# Patient Record
Sex: FEMALE | Race: WHITE | Employment: OTHER
[De-identification: names, ages, dates, MRNs, and addresses within clinical notes are randomized per-mention and may not be internally consistent; named-entity substitution may affect disease eponyms.]

---

## 2017-01-03 ENCOUNTER — OFFICE VISIT (OUTPATIENT)
Dept: FAMILY MEDICINE CLINIC | Facility: CLINIC | Age: 82
End: 2017-01-03

## 2017-01-03 VITALS
HEART RATE: 68 BPM | DIASTOLIC BLOOD PRESSURE: 70 MMHG | SYSTOLIC BLOOD PRESSURE: 132 MMHG | BODY MASS INDEX: 39.96 KG/M2 | WEIGHT: 204.6 LBS

## 2017-01-03 DIAGNOSIS — H10.33 ACUTE BACTERIAL CONJUNCTIVITIS OF BOTH EYES: Primary | ICD-10-CM

## 2017-01-03 DIAGNOSIS — B37.31 YEAST VAGINITIS: ICD-10-CM

## 2017-01-03 DIAGNOSIS — B37.0 ORAL THRUSH: ICD-10-CM

## 2017-01-03 DIAGNOSIS — H04.129 DRY EYES DUE TO DECREASED TEAR PRODUCTION: ICD-10-CM

## 2017-01-03 DIAGNOSIS — M19.91 PRIMARY OSTEOARTHRITIS, UNSPECIFIED SITE: ICD-10-CM

## 2017-01-03 PROCEDURE — 99214 OFFICE O/P EST MOD 30 MIN: CPT | Performed by: FAMILY MEDICINE

## 2017-01-03 PROCEDURE — 96372 THER/PROPH/DIAG INJ SC/IM: CPT | Performed by: FAMILY MEDICINE

## 2017-01-03 RX ORDER — METHYLPREDNISOLONE ACETATE 80 MG/ML
120 INJECTION, SUSPENSION INTRA-ARTICULAR; INTRALESIONAL; INTRAMUSCULAR; SOFT TISSUE ONCE
Status: COMPLETED | OUTPATIENT
Start: 2017-01-03 | End: 2017-01-03

## 2017-01-03 RX ORDER — CLOTRIMAZOLE 10 MG/1
10 LOZENGE ORAL; TOPICAL 3 TIMES DAILY
Qty: 30 TABLET | Refills: 0 | Status: SHIPPED | OUTPATIENT
Start: 2017-01-03 | End: 2017-01-13

## 2017-01-03 RX ORDER — FLUCONAZOLE 150 MG/1
150 TABLET ORAL ONCE
Qty: 2 TABLET | Refills: 0 | Status: SHIPPED | OUTPATIENT
Start: 2017-01-03 | End: 2017-01-03

## 2017-01-03 RX ADMIN — METHYLPREDNISOLONE ACETATE 120 MG: 80 INJECTION, SUSPENSION INTRA-ARTICULAR; INTRALESIONAL; INTRAMUSCULAR; SOFT TISSUE at 16:20

## 2017-02-13 ENCOUNTER — TELEPHONE (OUTPATIENT)
Dept: FAMILY MEDICINE CLINIC | Facility: CLINIC | Age: 82
End: 2017-02-13

## 2017-02-14 ENCOUNTER — OFFICE VISIT (OUTPATIENT)
Dept: FAMILY MEDICINE CLINIC | Facility: CLINIC | Age: 82
End: 2017-02-14

## 2017-02-14 VITALS
SYSTOLIC BLOOD PRESSURE: 120 MMHG | HEART RATE: 67 BPM | DIASTOLIC BLOOD PRESSURE: 70 MMHG | RESPIRATION RATE: 16 BRPM | OXYGEN SATURATION: 98 %

## 2017-02-14 DIAGNOSIS — S01.81XA LACERATION OF FACE, INITIAL ENCOUNTER: Primary | ICD-10-CM

## 2017-02-14 DIAGNOSIS — I89.0 LYMPHEDEMA OF BOTH LOWER EXTREMITIES: ICD-10-CM

## 2017-02-14 DIAGNOSIS — Z48.02 VISIT FOR SUTURE REMOVAL: ICD-10-CM

## 2017-02-14 DIAGNOSIS — R60.0 BILATERAL EDEMA OF LOWER EXTREMITY: Primary | ICD-10-CM

## 2017-02-14 PROBLEM — C50.919 BREAST CANCER IN FEMALE (HCC): Status: ACTIVE | Noted: 2017-02-10

## 2017-02-14 PROCEDURE — G8400 PT W/DXA NO RESULTS DOC: HCPCS | Performed by: FAMILY MEDICINE

## 2017-02-14 PROCEDURE — 4040F PNEUMOC VAC/ADMIN/RCVD: CPT | Performed by: FAMILY MEDICINE

## 2017-02-14 PROCEDURE — 1090F PRES/ABSN URINE INCON ASSESS: CPT | Performed by: FAMILY MEDICINE

## 2017-02-14 PROCEDURE — 1036F TOBACCO NON-USER: CPT | Performed by: FAMILY MEDICINE

## 2017-02-14 PROCEDURE — G8417 CALC BMI ABV UP PARAM F/U: HCPCS | Performed by: FAMILY MEDICINE

## 2017-02-14 PROCEDURE — G8427 DOCREV CUR MEDS BY ELIG CLIN: HCPCS | Performed by: FAMILY MEDICINE

## 2017-02-14 PROCEDURE — G8484 FLU IMMUNIZE NO ADMIN: HCPCS | Performed by: FAMILY MEDICINE

## 2017-02-14 PROCEDURE — 99214 OFFICE O/P EST MOD 30 MIN: CPT | Performed by: FAMILY MEDICINE

## 2017-02-14 PROCEDURE — 1123F ACP DISCUSS/DSCN MKR DOCD: CPT | Performed by: FAMILY MEDICINE

## 2017-02-14 RX ORDER — HYDROCODONE BITARTRATE AND ACETAMINOPHEN 5; 325 MG/1; MG/1
2 TABLET ORAL
COMMUNITY
Start: 2017-02-10 | End: 2017-02-14 | Stop reason: ALTCHOICE

## 2017-02-14 RX ORDER — MECLIZINE HYDROCHLORIDE 25 MG/1
TABLET ORAL
COMMUNITY
End: 2019-10-15 | Stop reason: SDUPTHER

## 2017-02-14 ASSESSMENT — PATIENT HEALTH QUESTIONNAIRE - PHQ9
SUM OF ALL RESPONSES TO PHQ QUESTIONS 1-9: 0
SUM OF ALL RESPONSES TO PHQ9 QUESTIONS 1 & 2: 0
1. LITTLE INTEREST OR PLEASURE IN DOING THINGS: 0
2. FEELING DOWN, DEPRESSED OR HOPELESS: 0

## 2017-02-15 ENCOUNTER — TELEPHONE (OUTPATIENT)
Dept: FAMILY MEDICINE CLINIC | Facility: CLINIC | Age: 82
End: 2017-02-15

## 2017-02-15 RX ORDER — PREDNISONE 10 MG/1
TABLET ORAL
Qty: 30 TABLET | Refills: 0 | Status: SHIPPED | OUTPATIENT
Start: 2017-02-15 | End: 2017-02-25

## 2017-04-13 ENCOUNTER — OFFICE VISIT (OUTPATIENT)
Dept: FAMILY MEDICINE CLINIC | Age: 82
End: 2017-04-13
Payer: MEDICARE

## 2017-04-13 VITALS
SYSTOLIC BLOOD PRESSURE: 140 MMHG | BODY MASS INDEX: 40.43 KG/M2 | HEART RATE: 60 BPM | DIASTOLIC BLOOD PRESSURE: 74 MMHG | WEIGHT: 207 LBS

## 2017-04-13 DIAGNOSIS — T23.102A BURN, HAND, FIRST DEGREE, LEFT, INITIAL ENCOUNTER: Primary | ICD-10-CM

## 2017-04-13 DIAGNOSIS — B36.9 FUNGAL DERMATITIS: ICD-10-CM

## 2017-04-13 DIAGNOSIS — L23.3 ALLERGIC CONTACT DERMATITIS DUE TO DRUGS IN CONTACT WITH SKIN: ICD-10-CM

## 2017-04-13 PROCEDURE — 99214 OFFICE O/P EST MOD 30 MIN: CPT | Performed by: FAMILY MEDICINE

## 2017-04-13 RX ORDER — NYSTATIN 100000 U/G
CREAM TOPICAL
Qty: 60 G | Refills: 3 | Status: SHIPPED | OUTPATIENT
Start: 2017-04-13 | End: 2018-07-02 | Stop reason: ALTCHOICE

## 2017-04-27 ENCOUNTER — OFFICE VISIT (OUTPATIENT)
Dept: FAMILY MEDICINE CLINIC | Age: 82
End: 2017-04-27
Payer: MEDICARE

## 2017-04-27 VITALS — SYSTOLIC BLOOD PRESSURE: 130 MMHG | HEART RATE: 74 BPM | DIASTOLIC BLOOD PRESSURE: 70 MMHG | OXYGEN SATURATION: 97 %

## 2017-04-27 DIAGNOSIS — T23.201D: Primary | ICD-10-CM

## 2017-04-27 DIAGNOSIS — L30.4 INTERTRIGO: ICD-10-CM

## 2017-04-27 PROCEDURE — 1036F TOBACCO NON-USER: CPT | Performed by: FAMILY MEDICINE

## 2017-04-27 PROCEDURE — G8428 CUR MEDS NOT DOCUMENT: HCPCS | Performed by: FAMILY MEDICINE

## 2017-04-27 PROCEDURE — 1123F ACP DISCUSS/DSCN MKR DOCD: CPT | Performed by: FAMILY MEDICINE

## 2017-04-27 PROCEDURE — G8417 CALC BMI ABV UP PARAM F/U: HCPCS | Performed by: FAMILY MEDICINE

## 2017-04-27 PROCEDURE — 1090F PRES/ABSN URINE INCON ASSESS: CPT | Performed by: FAMILY MEDICINE

## 2017-04-27 PROCEDURE — 99213 OFFICE O/P EST LOW 20 MIN: CPT | Performed by: FAMILY MEDICINE

## 2017-04-27 PROCEDURE — G8400 PT W/DXA NO RESULTS DOC: HCPCS | Performed by: FAMILY MEDICINE

## 2017-04-27 PROCEDURE — 4040F PNEUMOC VAC/ADMIN/RCVD: CPT | Performed by: FAMILY MEDICINE

## 2017-04-27 ASSESSMENT — ENCOUNTER SYMPTOMS
WHEEZING: 0
ABDOMINAL DISTENTION: 0
ABDOMINAL PAIN: 0
NAUSEA: 0
EYE PAIN: 0
SINUS PRESSURE: 0
FACIAL SWELLING: 0
VOICE CHANGE: 0
CONSTIPATION: 1
SORE THROAT: 0
EYE ITCHING: 0
PHOTOPHOBIA: 0
DIARRHEA: 0
COLOR CHANGE: 0
EYE REDNESS: 0
VOMITING: 0
COUGH: 0
CHEST TIGHTNESS: 0
SHORTNESS OF BREATH: 0
BACK PAIN: 0
RHINORRHEA: 0
BLOOD IN STOOL: 0
EYE DISCHARGE: 0

## 2017-06-08 ENCOUNTER — TELEPHONE (OUTPATIENT)
Dept: FAMILY MEDICINE CLINIC | Age: 82
End: 2017-06-08

## 2017-06-08 RX ORDER — CEPHALEXIN 500 MG/1
500 CAPSULE ORAL 2 TIMES DAILY
Qty: 20 CAPSULE | Refills: 0 | Status: SHIPPED | OUTPATIENT
Start: 2017-06-08 | End: 2017-06-18

## 2017-06-09 ENCOUNTER — OFFICE VISIT (OUTPATIENT)
Dept: FAMILY MEDICINE CLINIC | Age: 82
End: 2017-06-09
Payer: MEDICARE

## 2017-06-09 VITALS
OXYGEN SATURATION: 93 % | WEIGHT: 204.2 LBS | TEMPERATURE: 98.8 F | DIASTOLIC BLOOD PRESSURE: 62 MMHG | HEART RATE: 64 BPM | BODY MASS INDEX: 38.55 KG/M2 | SYSTOLIC BLOOD PRESSURE: 122 MMHG | HEIGHT: 61 IN | RESPIRATION RATE: 16 BRPM

## 2017-06-09 DIAGNOSIS — I48.21 PERMANENT ATRIAL FIBRILLATION (HCC): ICD-10-CM

## 2017-06-09 DIAGNOSIS — T14.8XXA HEMATOMA: Primary | ICD-10-CM

## 2017-06-09 DIAGNOSIS — J30.89 PERENNIAL ALLERGIC RHINITIS, UNSPECIFIED ALLERGIC RHINITIS TRIGGER: ICD-10-CM

## 2017-06-09 DIAGNOSIS — I47.1 PAT (PAROXYSMAL ATRIAL TACHYCARDIA) (HCC): ICD-10-CM

## 2017-06-09 PROCEDURE — 4040F PNEUMOC VAC/ADMIN/RCVD: CPT | Performed by: FAMILY MEDICINE

## 2017-06-09 PROCEDURE — 1090F PRES/ABSN URINE INCON ASSESS: CPT | Performed by: FAMILY MEDICINE

## 2017-06-09 PROCEDURE — G8400 PT W/DXA NO RESULTS DOC: HCPCS | Performed by: FAMILY MEDICINE

## 2017-06-09 PROCEDURE — 1036F TOBACCO NON-USER: CPT | Performed by: FAMILY MEDICINE

## 2017-06-09 PROCEDURE — G8417 CALC BMI ABV UP PARAM F/U: HCPCS | Performed by: FAMILY MEDICINE

## 2017-06-09 PROCEDURE — G8427 DOCREV CUR MEDS BY ELIG CLIN: HCPCS | Performed by: FAMILY MEDICINE

## 2017-06-09 PROCEDURE — 99213 OFFICE O/P EST LOW 20 MIN: CPT | Performed by: FAMILY MEDICINE

## 2017-06-09 PROCEDURE — 1123F ACP DISCUSS/DSCN MKR DOCD: CPT | Performed by: FAMILY MEDICINE

## 2017-06-09 RX ORDER — MONTELUKAST SODIUM 10 MG/1
10 TABLET ORAL DAILY
Qty: 30 TABLET | Refills: 3 | Status: SHIPPED | OUTPATIENT
Start: 2017-06-09 | End: 2018-07-02 | Stop reason: ALTCHOICE

## 2017-06-09 ASSESSMENT — ENCOUNTER SYMPTOMS
BACK PAIN: 0
COUGH: 0
BLOOD IN STOOL: 0
EYE DISCHARGE: 0
SHORTNESS OF BREATH: 0
PHOTOPHOBIA: 0
WHEEZING: 0
EYE ITCHING: 0
DIARRHEA: 0
EYE PAIN: 0
COLOR CHANGE: 0
SORE THROAT: 0
VOICE CHANGE: 0
ABDOMINAL DISTENTION: 0
FACIAL SWELLING: 0
VOMITING: 0
CONSTIPATION: 0
EYE REDNESS: 0
ABDOMINAL PAIN: 0
SINUS PRESSURE: 0
RHINORRHEA: 0
NAUSEA: 0
CHEST TIGHTNESS: 0

## 2017-09-08 DIAGNOSIS — G45.8 ANTERIOR CIRCULATION TRANSIENT ISCHEMIC ATTACK: ICD-10-CM

## 2017-09-08 DIAGNOSIS — I05.9 MITRAL VALVE DISEASE: ICD-10-CM

## 2017-09-08 DIAGNOSIS — I89.0 LYMPHEDEMA OF BOTH LOWER EXTREMITIES: ICD-10-CM

## 2017-09-08 DIAGNOSIS — I47.1 PAROXYSMAL SUPRAVENTRICULAR TACHYCARDIA (HCC): ICD-10-CM

## 2017-09-08 DIAGNOSIS — I89.0 ACQUIRED LYMPHEDEMA: ICD-10-CM

## 2017-09-08 DIAGNOSIS — Z95.0 PRESENCE OF CARDIAC PACEMAKER: ICD-10-CM

## 2018-06-03 ENCOUNTER — TELEPHONE (OUTPATIENT)
Dept: FAMILY MEDICINE CLINIC | Age: 83
End: 2018-06-03

## 2018-06-03 RX ORDER — PHENAZOPYRIDINE HYDROCHLORIDE 100 MG/1
100 TABLET, FILM COATED ORAL 3 TIMES DAILY PRN
Qty: 9 TABLET | Refills: 0 | Status: SHIPPED | OUTPATIENT
Start: 2018-06-03 | End: 2018-06-06

## 2018-06-03 RX ORDER — CEPHALEXIN 500 MG/1
500 CAPSULE ORAL 2 TIMES DAILY
Qty: 20 CAPSULE | Refills: 0 | Status: SHIPPED | OUTPATIENT
Start: 2018-06-03 | End: 2018-06-20

## 2018-06-16 ENCOUNTER — NURSE TRIAGE (OUTPATIENT)
Dept: OTHER | Age: 83
End: 2018-06-16

## 2018-06-20 ENCOUNTER — TELEPHONE (OUTPATIENT)
Dept: FAMILY MEDICINE CLINIC | Age: 83
End: 2018-06-20

## 2018-06-21 RX ORDER — LEVOFLOXACIN 500 MG/1
500 TABLET, FILM COATED ORAL DAILY
Qty: 7 TABLET | Refills: 0 | Status: SHIPPED | OUTPATIENT
Start: 2018-06-21 | End: 2018-06-21 | Stop reason: SDUPTHER

## 2018-06-21 RX ORDER — LEVOFLOXACIN 500 MG/1
500 TABLET, FILM COATED ORAL DAILY
Qty: 7 TABLET | Refills: 0 | Status: SHIPPED | OUTPATIENT
Start: 2018-06-21 | End: 2018-06-28

## 2018-07-02 ENCOUNTER — OFFICE VISIT (OUTPATIENT)
Dept: FAMILY MEDICINE CLINIC | Age: 83
End: 2018-07-02
Payer: MEDICARE

## 2018-07-02 VITALS
HEART RATE: 67 BPM | DIASTOLIC BLOOD PRESSURE: 58 MMHG | WEIGHT: 198.2 LBS | BODY MASS INDEX: 38.91 KG/M2 | HEIGHT: 60 IN | OXYGEN SATURATION: 98 % | SYSTOLIC BLOOD PRESSURE: 118 MMHG

## 2018-07-02 DIAGNOSIS — J31.0 CHRONIC RHINITIS, UNSPECIFIED TYPE: Primary | ICD-10-CM

## 2018-07-02 PROCEDURE — G8427 DOCREV CUR MEDS BY ELIG CLIN: HCPCS | Performed by: FAMILY MEDICINE

## 2018-07-02 PROCEDURE — 1123F ACP DISCUSS/DSCN MKR DOCD: CPT | Performed by: FAMILY MEDICINE

## 2018-07-02 PROCEDURE — 3288F FALL RISK ASSESSMENT DOCD: CPT | Performed by: FAMILY MEDICINE

## 2018-07-02 PROCEDURE — G8510 SCR DEP NEG, NO PLAN REQD: HCPCS | Performed by: FAMILY MEDICINE

## 2018-07-02 PROCEDURE — G8417 CALC BMI ABV UP PARAM F/U: HCPCS | Performed by: FAMILY MEDICINE

## 2018-07-02 PROCEDURE — 4040F PNEUMOC VAC/ADMIN/RCVD: CPT | Performed by: FAMILY MEDICINE

## 2018-07-02 PROCEDURE — 1090F PRES/ABSN URINE INCON ASSESS: CPT | Performed by: FAMILY MEDICINE

## 2018-07-02 PROCEDURE — 99213 OFFICE O/P EST LOW 20 MIN: CPT | Performed by: FAMILY MEDICINE

## 2018-07-02 PROCEDURE — 1036F TOBACCO NON-USER: CPT | Performed by: FAMILY MEDICINE

## 2018-07-02 RX ORDER — MONTELUKAST SODIUM 10 MG/1
10 TABLET ORAL DAILY
Qty: 30 TABLET | Refills: 5 | Status: SHIPPED | OUTPATIENT
Start: 2018-07-02 | End: 2018-10-18 | Stop reason: SINTOL

## 2018-07-02 ASSESSMENT — ENCOUNTER SYMPTOMS
SHORTNESS OF BREATH: 1
COUGH: 1
PHOTOPHOBIA: 0
EYE DISCHARGE: 0
VOMITING: 0
EYE PAIN: 0
NAUSEA: 0
CHEST TIGHTNESS: 0
SINUS PRESSURE: 0
CONSTIPATION: 0
SORE THROAT: 0
FACIAL SWELLING: 0
BACK PAIN: 0
VOICE CHANGE: 0
EYE ITCHING: 0
BLOOD IN STOOL: 0
WHEEZING: 0
COLOR CHANGE: 0
RHINORRHEA: 1
EYE REDNESS: 0
ABDOMINAL DISTENTION: 0
DIARRHEA: 0
ABDOMINAL PAIN: 0

## 2018-07-02 ASSESSMENT — PATIENT HEALTH QUESTIONNAIRE - PHQ9
SUM OF ALL RESPONSES TO PHQ QUESTIONS 1-9: 0
2. FEELING DOWN, DEPRESSED OR HOPELESS: 0
SUM OF ALL RESPONSES TO PHQ9 QUESTIONS 1 & 2: 0
1. LITTLE INTEREST OR PLEASURE IN DOING THINGS: 0

## 2018-07-02 NOTE — PROGRESS NOTES
side.  Skin: Skin is warm and dry. No lesion and no rash noted. She is not diaphoretic. No cyanosis. Nails show no clubbing. Psychiatric: She has a normal mood and affect. Her speech is normal and behavior is normal. Judgment and thought content normal. Cognition and memory are normal.     Vitals:    07/02/18 1045   BP: (!) 118/58   Pulse: 67   SpO2: 98%   Weight: 198 lb 3.2 oz (89.9 kg)   Height: 5' (1.524 m)         Assessment:          Plan:      1. Chronic rhinitis, unspecified type  Will have her start on singulair and have her follow up as needed based on the results. - montelukast (SINGULAIR) 10 MG tablet; Take 1 tablet by mouth daily  Dispense: 30 tablet;  Refill: 5

## 2018-07-06 DIAGNOSIS — N39.0 URINARY TRACT INFECTION WITHOUT HEMATURIA, SITE UNSPECIFIED: Primary | ICD-10-CM

## 2018-07-06 LAB
BILIRUBIN, POC: NEGATIVE
BLOOD URINE, POC: NORMAL
CLARITY, POC: NORMAL
COLOR, POC: YELLOW
GLUCOSE URINE, POC: NEGATIVE
KETONES, POC: NEGATIVE
LEUKOCYTE EST, POC: NEGATIVE
NITRITE, POC: NEGATIVE
PH, POC: 6
PROTEIN, POC: NEGATIVE
SPECIFIC GRAVITY, POC: 1
UROBILINOGEN, POC: 0.2

## 2018-07-06 PROCEDURE — 81003 URINALYSIS AUTO W/O SCOPE: CPT | Performed by: FAMILY MEDICINE

## 2018-10-18 ENCOUNTER — OFFICE VISIT (OUTPATIENT)
Dept: FAMILY MEDICINE CLINIC | Age: 83
End: 2018-10-18
Payer: MEDICARE

## 2018-10-18 VITALS
HEART RATE: 61 BPM | OXYGEN SATURATION: 96 % | BODY MASS INDEX: 38.83 KG/M2 | DIASTOLIC BLOOD PRESSURE: 62 MMHG | WEIGHT: 198.8 LBS | SYSTOLIC BLOOD PRESSURE: 136 MMHG

## 2018-10-18 DIAGNOSIS — Z00.00 MEDICARE ANNUAL WELLNESS VISIT, SUBSEQUENT: Primary | ICD-10-CM

## 2018-10-18 DIAGNOSIS — I48.20 CHRONIC ATRIAL FIBRILLATION (HCC): ICD-10-CM

## 2018-10-18 DIAGNOSIS — I49.5 SICK SINUS SYNDROME (HCC): ICD-10-CM

## 2018-10-18 DIAGNOSIS — Z23 IMMUNIZATION DUE: ICD-10-CM

## 2018-10-18 DIAGNOSIS — I47.1 PAROXYSMAL SUPRAVENTRICULAR TACHYCARDIA (HCC): ICD-10-CM

## 2018-10-18 DIAGNOSIS — Z00.00 ROUTINE GENERAL MEDICAL EXAMINATION AT A HEALTH CARE FACILITY: ICD-10-CM

## 2018-10-18 PROCEDURE — G8482 FLU IMMUNIZE ORDER/ADMIN: HCPCS | Performed by: FAMILY MEDICINE

## 2018-10-18 PROCEDURE — 4040F PNEUMOC VAC/ADMIN/RCVD: CPT | Performed by: FAMILY MEDICINE

## 2018-10-18 PROCEDURE — G0438 PPPS, INITIAL VISIT: HCPCS | Performed by: FAMILY MEDICINE

## 2018-10-18 PROCEDURE — 90662 IIV NO PRSV INCREASED AG IM: CPT | Performed by: FAMILY MEDICINE

## 2018-10-18 PROCEDURE — G0008 ADMIN INFLUENZA VIRUS VAC: HCPCS | Performed by: FAMILY MEDICINE

## 2018-10-18 ASSESSMENT — ENCOUNTER SYMPTOMS
COUGH: 0
EYE ITCHING: 0
EYE REDNESS: 0
CHEST TIGHTNESS: 0
SHORTNESS OF BREATH: 0
ABDOMINAL PAIN: 0
EYE DISCHARGE: 0
SORE THROAT: 0
ABDOMINAL DISTENTION: 0
DIARRHEA: 0
BLOOD IN STOOL: 0
NAUSEA: 0
CONSTIPATION: 1
COLOR CHANGE: 0
WHEEZING: 0
EYE PAIN: 0
VOMITING: 0
PHOTOPHOBIA: 0
BACK PAIN: 0
FACIAL SWELLING: 0
VOICE CHANGE: 0
SINUS PRESSURE: 0
RHINORRHEA: 0

## 2018-10-18 ASSESSMENT — ANXIETY QUESTIONNAIRES: GAD7 TOTAL SCORE: 1

## 2018-10-18 ASSESSMENT — PATIENT HEALTH QUESTIONNAIRE - PHQ9
SUM OF ALL RESPONSES TO PHQ QUESTIONS 1-9: 1
SUM OF ALL RESPONSES TO PHQ QUESTIONS 1-9: 1

## 2018-10-18 ASSESSMENT — LIFESTYLE VARIABLES: HOW OFTEN DO YOU HAVE A DRINK CONTAINING ALCOHOL: 0

## 2018-10-18 NOTE — PROGRESS NOTES
Subjective:      Patient ID: Chino Espinal is a 80 y.o. female. HPI  Here for annual well check and has been well generally aside from aches and pains. She has been feeling increasingly fatigued and has to put in more effort to attend to ADLs than she used to and has been using her walker all the time to ensure stability. She has been continuing to live alone but has a lot of help at home. She also has someone coming in to help her with the lymphedema pumps. She has been maintaining a healthy diet and has been staying well hydrated for the most part. She has been sleeping well as a general rule. Review of Systems   Constitutional: Negative for activity change, appetite change, chills, diaphoresis, fatigue, fever and unexpected weight change. HENT: Negative for congestion, ear pain, facial swelling, hearing loss, postnasal drip, rhinorrhea, sinus pressure, sore throat and voice change. Eyes: Negative for photophobia, pain, discharge, redness, itching and visual disturbance. Respiratory: Negative for cough, chest tightness, shortness of breath and wheezing. Cardiovascular: Positive for leg swelling (chronic lymphedema. ). Negative for chest pain and palpitations. Gastrointestinal: Positive for constipation. Negative for abdominal distention, abdominal pain, blood in stool, diarrhea, nausea and vomiting. Endocrine: Negative for cold intolerance and heat intolerance. Genitourinary: Negative for decreased urine volume, difficulty urinating, dysuria, flank pain, frequency, hematuria, pelvic pain, urgency, vaginal bleeding and vaginal discharge. Musculoskeletal: Positive for arthralgias (polyarthropathy with the worst pain in the knees. ). Negative for back pain, gait problem, joint swelling, myalgias, neck pain and neck stiffness. Skin: Negative for color change, pallor and rash. Allergic/Immunologic: Negative for environmental allergies and food allergies.    Neurological: (PRINIVIL;ZESTRIL) 5 MG tablet Take 5 mg by mouth daily. Yes Historical Provider, MD   propranolol (INDERAL) 20 MG tablet TAKE 1 TABLET THREE TIMES A DAY Yes Patrick Graham MD   polyethylene glycol (MIRALAX) powder Take 17 g by mouth daily as needed. Yes Historical Provider, MD   omeprazole (PRILOSEC) 40 MG capsule Take 40 mg by mouth daily. Yes Historical Provider, MD     Past Medical History:   Diagnosis Date    Atrial [facility] ([facility]     Cancer ([facility] [facility]     breast    GERD (gastroesophageal reflux disease)     Hypertension     Osteoarthritis     Sick sinus syndrome (HCC)      Past Surgical History:   Procedure Laterality Date    APPENDECTOMY      BREAST SURGERY      lumpectomy right breast w/ axillary lymph node dissection    CATARACT REMOVAL      HYSTERECTOMY      HYSTERECTOMY, TOTAL ABDOMINAL      PACEMAKER PLACEMENT      SALPINGO-OOPHORECTOMY      bilateral     No family history on file. CareTeam (Including outside providers/suppliers regularly involved in providing care):   Patient Care Team:  Norma Devi MD as PCP - Ean Barfield MD as PCP - [facility] Attributed Provider  Gerald Samson MD as Consulting Physician (Internal Medicine)  Beatriz Pascual MD (Vascular Surgery)  Timothy Romero MD as Surgeon (Cardiology)    Wt Readings from Last 3 Encounters:   10/18/18 198 lb 12.8 oz (90.2 kg)   07/02/18 198 lb 3.2 oz (89.9 kg)   06/09/17 204 lb 3.2 oz (92.6 kg)     Vitals:    10/18/18 1406   BP: 136/62   Pulse: 61   SpO2: 96%   Weight: 198 lb 12.8 oz (90.2 kg)     Body mass index is 38.83 kg/m².     General Appearance: alert and oriented to person, place and time, well developed and well- nourished, in no acute distress  Skin: warm and dry, no rash or erythema  Head: normocephalic and atraumatic  Eyes: pupils equal, round, and reactive to light, extraocular eye movements intact, conjunctivae normal  ENT: tympanic membrane, external ear and ear canal normal bilaterally, nose difficulty driving, watching TV, or doing any of your daily activities because of your eyesight?: No  Have you had an eye exam within the past year?: Yes  Hearing/Vision Interventions:  · discussed hearing decline. ADL:  ADLs  In the past 7 days, did you need help from others to perform any of the following everyday activities?: (!) Walking/Balance  In the past 7 days, did you need help from others to take care of any of the following?: [employer], Housekeeping, [employer], Shopping, Transportation  ADL Interventions:  · None indicated. Personalized Preventive Plan   Current Health Maintenance Status  Immunization History   Administered Date(s) Administered    Influenza Virus Vaccine 10/09/2015, 10/17/2017    Influenza, High Dose (Fluzone 65 yrs and older) 10/10/2016    Pneumococcal 13-valent Conjugate ([id number]) 11/14/2017    Tdap (Boostrix, Adacel) 12/16/2013        Health Maintenance   Topic Date Due    Shingles Vaccine (1 of 2 - 2 Dose Series) 06/26/1981    Potassium monitoring  04/20/2017    Creatinine monitoring  04/20/2017    Flu vaccine (1) 09/01/2018    Pneumococcal low/med risk (2 of 2 - PPSV23) 11/14/2018    DTaP/Tdap/Td vaccine (2 - Td) 12/16/2023     Recommendations for Preventive Services Due: see orders and patient instructions/AVS.  .   Recommended screening schedule for the next 5-10 years is provided to the patient in written form: see Patient Instructions/AVS.

## 2018-10-24 ENCOUNTER — TELEPHONE (OUTPATIENT)
Dept: FAMILY MEDICINE CLINIC | Age: 83
End: 2018-10-24

## 2019-09-24 ENCOUNTER — TELEPHONE (OUTPATIENT)
Dept: FAMILY MEDICINE CLINIC | Age: 84
End: 2019-09-24

## 2019-09-24 NOTE — TELEPHONE ENCOUNTER
PT CALLED TO MAKE AN APPOINTMENT  PT WANTS TO BE SEEN FOR A CHECK UP AND HER BIG TOE NAIL CLIPPED.   CALL PT TO MAKE AN APPOINTMENT

## 2019-10-15 ENCOUNTER — OFFICE VISIT (OUTPATIENT)
Dept: FAMILY MEDICINE CLINIC | Age: 84
End: 2019-10-15
Payer: MEDICARE

## 2019-10-15 VITALS
OXYGEN SATURATION: 95 % | WEIGHT: 199.2 LBS | HEART RATE: 65 BPM | DIASTOLIC BLOOD PRESSURE: 64 MMHG | SYSTOLIC BLOOD PRESSURE: 132 MMHG | BODY MASS INDEX: 38.9 KG/M2

## 2019-10-15 DIAGNOSIS — Z23 IMMUNIZATION DUE: ICD-10-CM

## 2019-10-15 DIAGNOSIS — I89.0 LYMPHEDEMA OF BOTH LOWER EXTREMITIES: ICD-10-CM

## 2019-10-15 DIAGNOSIS — I47.1 PAROXYSMAL SUPRAVENTRICULAR TACHYCARDIA (HCC): ICD-10-CM

## 2019-10-15 DIAGNOSIS — I49.5 SICK SINUS SYNDROME (HCC): ICD-10-CM

## 2019-10-15 DIAGNOSIS — I48.91 ATRIAL FIBRILLATION, UNSPECIFIED TYPE (HCC): ICD-10-CM

## 2019-10-15 DIAGNOSIS — R42 DIZZINESS: Primary | ICD-10-CM

## 2019-10-15 DIAGNOSIS — B35.1 ONYCHOMYCOSIS: ICD-10-CM

## 2019-10-15 DIAGNOSIS — K21.9 GASTROESOPHAGEAL REFLUX DISEASE WITHOUT ESOPHAGITIS: ICD-10-CM

## 2019-10-15 DIAGNOSIS — K59.00 CONSTIPATION, UNSPECIFIED CONSTIPATION TYPE: ICD-10-CM

## 2019-10-15 PROCEDURE — 1123F ACP DISCUSS/DSCN MKR DOCD: CPT | Performed by: NURSE PRACTITIONER

## 2019-10-15 PROCEDURE — 99215 OFFICE O/P EST HI 40 MIN: CPT | Performed by: NURSE PRACTITIONER

## 2019-10-15 PROCEDURE — G8417 CALC BMI ABV UP PARAM F/U: HCPCS | Performed by: NURSE PRACTITIONER

## 2019-10-15 PROCEDURE — 1090F PRES/ABSN URINE INCON ASSESS: CPT | Performed by: NURSE PRACTITIONER

## 2019-10-15 PROCEDURE — G0008 ADMIN INFLUENZA VIRUS VAC: HCPCS | Performed by: NURSE PRACTITIONER

## 2019-10-15 PROCEDURE — 90653 IIV ADJUVANT VACCINE IM: CPT | Performed by: NURSE PRACTITIONER

## 2019-10-15 PROCEDURE — G8482 FLU IMMUNIZE ORDER/ADMIN: HCPCS | Performed by: NURSE PRACTITIONER

## 2019-10-15 PROCEDURE — G8427 DOCREV CUR MEDS BY ELIG CLIN: HCPCS | Performed by: NURSE PRACTITIONER

## 2019-10-15 PROCEDURE — 1036F TOBACCO NON-USER: CPT | Performed by: NURSE PRACTITIONER

## 2019-10-15 PROCEDURE — 4040F PNEUMOC VAC/ADMIN/RCVD: CPT | Performed by: NURSE PRACTITIONER

## 2019-10-15 RX ORDER — POLYETHYLENE GLYCOL 3350 17 G/17G
17 POWDER, FOR SOLUTION ORAL DAILY PRN
Qty: 250 G | Refills: 6 | Status: SHIPPED | OUTPATIENT
Start: 2019-10-15

## 2019-10-15 RX ORDER — ESOMEPRAZOLE MAGNESIUM 20 MG/1
1 TABLET, DELAYED RELEASE ORAL DAILY
Qty: 90 TABLET | Refills: 3 | Status: SHIPPED | OUTPATIENT
Start: 2019-10-15

## 2019-10-15 RX ORDER — MECLIZINE HCL 12.5 MG/1
12.5 TABLET ORAL 2 TIMES DAILY PRN
Qty: 30 TABLET | Refills: 1 | Status: SHIPPED | OUTPATIENT
Start: 2019-10-15

## 2019-10-15 ASSESSMENT — PATIENT HEALTH QUESTIONNAIRE - PHQ9
2. FEELING DOWN, DEPRESSED OR HOPELESS: 1
1. LITTLE INTEREST OR PLEASURE IN DOING THINGS: 0
SUM OF ALL RESPONSES TO PHQ9 QUESTIONS 1 & 2: 1
SUM OF ALL RESPONSES TO PHQ QUESTIONS 1-9: 1
SUM OF ALL RESPONSES TO PHQ QUESTIONS 1-9: 1

## 2019-11-12 ENCOUNTER — OFFICE VISIT (OUTPATIENT)
Dept: PODIATRY | Age: 84
End: 2019-11-12
Payer: MEDICARE

## 2019-11-12 VITALS — BODY MASS INDEX: 38.87 KG/M2 | HEIGHT: 60 IN | RESPIRATION RATE: 16 BRPM | WEIGHT: 198 LBS

## 2019-11-12 DIAGNOSIS — L60.0 OC (ONYCHOCRYPTOSIS): Primary | ICD-10-CM

## 2019-11-12 DIAGNOSIS — R60.0 EDEMA OF LOWER EXTREMITY: ICD-10-CM

## 2019-11-12 DIAGNOSIS — B35.1 DERMATOPHYTOSIS OF NAIL: ICD-10-CM

## 2019-11-12 DIAGNOSIS — I73.9 PVD (PERIPHERAL VASCULAR DISEASE) (HCC): ICD-10-CM

## 2019-11-12 PROCEDURE — 99203 OFFICE O/P NEW LOW 30 MIN: CPT | Performed by: PODIATRIST

## 2019-11-12 PROCEDURE — 11721 DEBRIDE NAIL 6 OR MORE: CPT | Performed by: PODIATRIST

## 2019-11-12 PROCEDURE — G8482 FLU IMMUNIZE ORDER/ADMIN: HCPCS | Performed by: PODIATRIST

## 2019-11-12 PROCEDURE — G8427 DOCREV CUR MEDS BY ELIG CLIN: HCPCS | Performed by: PODIATRIST

## 2019-11-12 PROCEDURE — 1123F ACP DISCUSS/DSCN MKR DOCD: CPT | Performed by: PODIATRIST

## 2019-11-12 PROCEDURE — 1036F TOBACCO NON-USER: CPT | Performed by: PODIATRIST

## 2019-11-12 PROCEDURE — G8417 CALC BMI ABV UP PARAM F/U: HCPCS | Performed by: PODIATRIST

## 2019-11-12 PROCEDURE — 1090F PRES/ABSN URINE INCON ASSESS: CPT | Performed by: PODIATRIST

## 2019-11-12 PROCEDURE — 4040F PNEUMOC VAC/ADMIN/RCVD: CPT | Performed by: PODIATRIST

## 2020-01-21 ENCOUNTER — OFFICE VISIT (OUTPATIENT)
Dept: PODIATRY | Age: 85
End: 2020-01-21
Payer: MEDICARE

## 2020-01-21 VITALS — HEIGHT: 60 IN | RESPIRATION RATE: 16 BRPM | BODY MASS INDEX: 38.87 KG/M2 | WEIGHT: 198 LBS

## 2020-01-21 PROCEDURE — G8427 DOCREV CUR MEDS BY ELIG CLIN: HCPCS | Performed by: PODIATRIST

## 2020-01-21 PROCEDURE — G8417 CALC BMI ABV UP PARAM F/U: HCPCS | Performed by: PODIATRIST

## 2020-01-21 PROCEDURE — 1090F PRES/ABSN URINE INCON ASSESS: CPT | Performed by: PODIATRIST

## 2020-01-21 PROCEDURE — 1036F TOBACCO NON-USER: CPT | Performed by: PODIATRIST

## 2020-01-21 PROCEDURE — 11721 DEBRIDE NAIL 6 OR MORE: CPT | Performed by: PODIATRIST

## 2020-01-21 PROCEDURE — 99213 OFFICE O/P EST LOW 20 MIN: CPT | Performed by: PODIATRIST

## 2020-01-21 PROCEDURE — 4040F PNEUMOC VAC/ADMIN/RCVD: CPT | Performed by: PODIATRIST

## 2020-01-21 PROCEDURE — G8482 FLU IMMUNIZE ORDER/ADMIN: HCPCS | Performed by: PODIATRIST

## 2020-01-21 PROCEDURE — 1123F ACP DISCUSS/DSCN MKR DOCD: CPT | Performed by: PODIATRIST

## 2020-01-21 NOTE — PROGRESS NOTES
Esomeprazole Magnesium (NEXIUM 24HR) 20 MG TBEC Take 20 mg by mouth daily 90 tablet 3    diclofenac (PENNSAID) 2 % SOLN Place 2 g onto the skin 2 times daily 112 g 0    apixaban (ELIQUIS) 5 MG TABS tablet Take 5 mg by mouth      nystatin (MYCOSTATIN) 059201 UNIT/GM cream Apply topically 2 times daily. 1 Tube 3    lisinopril (PRINIVIL;ZESTRIL) 5 MG tablet Take 5 mg by mouth daily.  propranolol (INDERAL) 20 MG tablet TAKE 1 TABLET THREE TIMES A DAY (Patient taking differently: 40 mg 2 times daily ) 270 tablet 0     No current facility-administered medications on file prior to visit. Review of Systems. Review of Systems:   History obtained from chart review and the patient  General ROS: negative for - chills, fatigue, fever, night sweats or weight gain  Constitutional: Negative for chills, diaphoresis, fatigue, fever and unexpected weight change. Musculoskeletal: Positive for arthralgias, gait problem and joint swelling. Neurological ROS: negative for - behavioral changes, confusion, headaches or seizures. Negative for weakness and numbness. Dermatological ROS: negative for - mole changes, rash  Cardiovascular: Negative for leg swelling. Gastrointestinal: Negative for constipation, diarrhea, nausea and vomiting. Objective:  General: AAO x 3 in NAD.     Derm  Toenail Description  Sites of Onychomycosis Involvement (Check affected area)  [x] [x] [x] [x] [x] [x] [x] [x] [x] [x]  5 4 3 2 1 1 2 3 4 5                          Right                                        Left    Thickness  [x] [x] [x] [x] [x] [x] [x] [x] [x] [x]  5 4 3 2 1 1 2 3 4 5                         Right                                        Left    Dystrophic Changes   [x] [x] [x] [x] [x] [x] [x] [x] [x] [x]  5 4 3 2 1 1 2 3 4 5                         Right                                        Left    Color  [x] [x] [x] [x] [x] [x] [x] [x] [x] [x]  5 4 3 2 1 1 2 3 4 5                          Right Left    Incurvation/Ingrowin   [] [] [] [] [] [] [] [] [] []  5 4 3 2 1 1 2 3 4 5                         Right                                        Left    Inflammation/Pain   [x] [x] [x] [x] [x] [x] [x] [x] [x] [x]  5 4 3  2 1 1 2 3 4 5                         Right                                        Left       Nails are painful 1-10 with direct palpation. Dermatologic Exam:   Dry scaly skin noted to the plantar surface of the right and left foot. Small superficial fissuring of the skin noted to the plantar heel bilateral      Skin is thin, with flaky sloughing skin as well as decreased hair growth to the lower leg  Small red hemosiderin deposits seen dorsal foot   Musculoskeletal:     1st MPJ ROM decreased, Bilateral.  Muscle strength 5/5, Bilateral.  Pain present upon palpation of toenails 1-5, Bilateral. decreased medial longitudinal arch, Bilateral.  Ankle ROM decreased,Bilateral.    Dorsally contracted digits present digits 2, Bilateral.     Vascular: DP pulses 1/4 bilateral.  PT pulses 0/4 bilateral.   CFT <5 seconds, Bilateral.  Hair growth absent to the level of the digits, Bilateral.  Edema present, Bilateral.  Varicosities absent, Bilateral. Erythema absent, Bilateral    Neurological: Sensation diminshed to light touch to level of digits, Bilateral.  Protective sensation intact 6/10 sites via 5.07/10g [place] Monofilament, Bilateral.  negative Tinel's, Bilateral.  negative Valleix sign, Bilateral.      Integument: Warm, dry, supple, Bilateral.  Open lesion absent, Bilateral.  Interdigital maceration absent to web spaces 4, Bilateral.  Nails 1-5 left and 1-5 right thickened > 3.0 mm, dystrophic and crumbly, discolored with subungual debris. Fissures absent, Bilateral.       Assessment:  80 y.o. female with:    Diagnosis Orders   1. OC (onychocryptosis)     2. Edema of lower extremity  27173 - [place] DEBRIDEMENT OF NAILS, 6 OR MORE   3.  Dermatophytosis of nail  [phone] - [place] DEBRIDEMENT OF NAILS, 6 OR MORE   4. Pain in both feet  70164 - [place] DEBRIDEMENT OF NAILS, 6 OR MORE   5. Xerosis of skin           Plan:   Pt was evaluated and examined. Patient was given personalized discharge instructions. For patients xerosis of skin pt to apply OTC foot cream or Aquaphor to the area to be applied daily. Pt to use a pummuce stone to the plantar surface of the feet weekly    Nails 1-10 were debrided in length and thickness sharply with a nail nipper and  without incident. Pt will follow up in 9 weeks or sooner if any problems arise. Diagnosis was discussed with the pt and all of their questions were answered in detail. Proper foot hygiene and care was discussed with the pt. Patient to check feet daily and contact the office with any questions/problems/concerns. Other comorbidity noted and will be managed by PCP. Pain waiver discussed with patient and confirmed.    1/21/2020      Electronically signed by Teresa Heimlich, DPM on 1/21/2020 at 1:02 PM  1/21/2020

## 2020-06-09 ENCOUNTER — TELEPHONE (OUTPATIENT)
Dept: FAMILY MEDICINE CLINIC | Age: 85
End: 2020-06-09

## 2020-07-19 ENCOUNTER — NURSE TRIAGE (OUTPATIENT)
Dept: OTHER | Age: 85
End: 2020-07-19

## 2020-07-19 ENCOUNTER — TELEPHONE (OUTPATIENT)
Dept: FAMILY MEDICINE CLINIC | Age: 85
End: 2020-07-19

## 2020-07-19 RX ORDER — CEPHALEXIN 500 MG/1
500 CAPSULE ORAL 2 TIMES DAILY
Qty: 14 CAPSULE | Refills: 0 | Status: SHIPPED | OUTPATIENT
Start: 2020-07-19 | End: 2020-07-26

## 2020-07-19 NOTE — TELEPHONE ENCOUNTER
Caller states she is having frequency with urinary and lower pelvic pressure, denies pain/burning with urination, denies hematuria, states she is going to the bathroom every couple hours. Denies flank pain. Care advice given per guidelines, caller requesting to speak with on call today wants something to take care of it today.     Reason for Disposition   Urinating more frequently than usual (i.e., frequency)    Protocols used: [facility]

## 2020-07-19 NOTE — TELEPHONE ENCOUNTER
Patient called on call provider with s/s of UTI. C/o pelvic pressure and urinary frequency which started in the past 24 hours. Patient declines fevers or flank pain. She is refusing to drop off a urine sample to the lab. Antibiotic was sent to pharmacy. Patient was instructed to f/u with BS tomorrow.

## 2020-08-28 ENCOUNTER — TELEPHONE (OUTPATIENT)
Dept: FAMILY MEDICINE CLINIC | Age: 85
End: 2020-08-28

## 2020-08-28 ENCOUNTER — NURSE TRIAGE (OUTPATIENT)
Dept: OTHER | Facility: CLINIC | Age: 85
End: 2020-08-28

## 2020-08-31 ENCOUNTER — HOSPITAL ENCOUNTER (OUTPATIENT)
Age: 85
Setting detail: SPECIMEN
Discharge: HOME | End: 2020-08-31
Payer: MEDICARE

## 2020-08-31 PROBLEM — R60.9 EDEMA: Status: ACTIVE | Noted: 2020-08-31

## 2020-08-31 PROBLEM — M54.30 SCIATICA: Status: ACTIVE | Noted: 2020-08-31

## 2020-08-31 PROBLEM — N28.1 KIDNEY CYSTS: Status: ACTIVE | Noted: 2020-08-31

## 2020-08-31 PROBLEM — R07.9 CHEST PAIN: Status: ACTIVE | Noted: 2020-08-31

## 2020-08-31 PROBLEM — R09.89 BRUIT: Status: ACTIVE | Noted: 2020-08-31

## 2020-08-31 LAB
-: ABNORMAL
AMORPHOUS: ABNORMAL
BACTERIA: ABNORMAL
BILIRUBIN URINE: NEGATIVE
CASTS UA: ABNORMAL /LPF (ref 0–8)
COLOR: YELLOW
COMMENT UA: ABNORMAL
CRYSTALS, UA: ABNORMAL /HPF
EPITHELIAL CELLS UA: ABNORMAL /HPF (ref 0–5)
GLUCOSE URINE: NEGATIVE
KETONES, URINE: NEGATIVE
LEUKOCYTE ESTERASE, URINE: ABNORMAL
MUCUS: ABNORMAL
NITRITE, URINE: NEGATIVE
OTHER OBSERVATIONS UA: ABNORMAL
PH UA: 5.5 (ref 5–8)
PROTEIN UA: NEGATIVE
RBC UA: ABNORMAL /HPF (ref 0–4)
RENAL EPITHELIAL, UA: ABNORMAL /HPF
SPECIFIC GRAVITY UA: 1.01 (ref 1–1.03)
TRICHOMONAS: ABNORMAL
TURBIDITY: ABNORMAL
URINE HGB: ABNORMAL
UROBILINOGEN, URINE: NORMAL
WBC UA: ABNORMAL /HPF (ref 0–5)
YEAST: ABNORMAL

## 2020-08-31 RX ORDER — CEPHALEXIN 500 MG/1
500 CAPSULE ORAL 2 TIMES DAILY
Qty: 10 CAPSULE | Refills: 0 | Status: SHIPPED | OUTPATIENT
Start: 2020-08-31 | End: 2020-09-01

## 2020-09-01 LAB
CULTURE: ABNORMAL
Lab: ABNORMAL
SPECIMEN DESCRIPTION: ABNORMAL

## 2020-09-01 RX ORDER — CEPHALEXIN 500 MG/1
500 CAPSULE ORAL 2 TIMES DAILY
Qty: 14 CAPSULE | Refills: 0 | Status: SHIPPED | OUTPATIENT
Start: 2020-09-01 | End: 2020-09-08

## 2020-09-10 ENCOUNTER — TELEPHONE (OUTPATIENT)
Dept: FAMILY MEDICINE CLINIC | Age: 85
End: 2020-09-10

## 2020-09-10 NOTE — TELEPHONE ENCOUNTER
Pt says that she knows that her UTI will come back because she only got a 7 day course of anti-biotics and it never goes away until she takes 14 days of antibiotics. I advised her that if she is still having trouble she should come in for appt but she says she will not go anywhere due to covid 19.   She says the symptoms have all cleared up but she knows they will come back

## 2020-09-10 NOTE — TELEPHONE ENCOUNTER
Patient will need to be seen - she has not been seen for almost 1 year. I did not order the antibiotic and she will need to contact the office with symptoms and a culture will need to be completed prior to further orders.

## 2020-10-08 ENCOUNTER — TELEPHONE (OUTPATIENT)
Dept: FAMILY MEDICINE CLINIC | Age: 85
End: 2020-10-08

## 2020-10-08 NOTE — TELEPHONE ENCOUNTER
Patient has pain when urinating, pressure, urine order. Son has an 8:00 am 10/09/20 here with Dr. Adi Chapman    He will be bringing in a urine sample of Ambrose Ledezma which sees Fadi Presley    Can you send in script today for her?

## 2020-10-08 NOTE — TELEPHONE ENCOUNTER
Patient has not been seen in the office since 10/2019. Luis Miguel Mauro is not able to continue treating over the phone. Patient will need an appointment. We can also refer to Urology if patient would like.

## 2020-10-09 ENCOUNTER — OFFICE VISIT (OUTPATIENT)
Dept: FAMILY MEDICINE CLINIC | Age: 85
End: 2020-10-09
Payer: MEDICARE

## 2020-10-09 ENCOUNTER — HOSPITAL ENCOUNTER (OUTPATIENT)
Age: 85
Setting detail: SPECIMEN
Discharge: HOME | End: 2020-10-09
Payer: MEDICARE

## 2020-10-09 VITALS
DIASTOLIC BLOOD PRESSURE: 78 MMHG | SYSTOLIC BLOOD PRESSURE: 138 MMHG | TEMPERATURE: 98.4 F | HEIGHT: 60 IN | HEART RATE: 71 BPM | BODY MASS INDEX: 38.67 KG/M2

## 2020-10-09 LAB
BILIRUBIN, POC: NEGATIVE
BLOOD URINE, POC: ABNORMAL
CLARITY, POC: CLEAR
COLOR, POC: YELLOW
GLUCOSE URINE, POC: ABNORMAL
KETONES, POC: NEGATIVE
LEUKOCYTE EST, POC: ABNORMAL
NITRITE, POC: NEGATIVE
PH, POC: 5.5
PROTEIN, POC: NEGATIVE
SPECIFIC GRAVITY, POC: 1.01
UROBILINOGEN, POC: 0.2

## 2020-10-09 PROCEDURE — G8484 FLU IMMUNIZE NO ADMIN: HCPCS | Performed by: NURSE PRACTITIONER

## 2020-10-09 PROCEDURE — 4040F PNEUMOC VAC/ADMIN/RCVD: CPT | Performed by: NURSE PRACTITIONER

## 2020-10-09 PROCEDURE — 81003 URINALYSIS AUTO W/O SCOPE: CPT | Performed by: NURSE PRACTITIONER

## 2020-10-09 PROCEDURE — 99214 OFFICE O/P EST MOD 30 MIN: CPT | Performed by: NURSE PRACTITIONER

## 2020-10-09 PROCEDURE — 1090F PRES/ABSN URINE INCON ASSESS: CPT | Performed by: NURSE PRACTITIONER

## 2020-10-09 PROCEDURE — G8427 DOCREV CUR MEDS BY ELIG CLIN: HCPCS | Performed by: NURSE PRACTITIONER

## 2020-10-09 PROCEDURE — 1036F TOBACCO NON-USER: CPT | Performed by: NURSE PRACTITIONER

## 2020-10-09 PROCEDURE — G8417 CALC BMI ABV UP PARAM F/U: HCPCS | Performed by: NURSE PRACTITIONER

## 2020-10-09 PROCEDURE — 1123F ACP DISCUSS/DSCN MKR DOCD: CPT | Performed by: NURSE PRACTITIONER

## 2020-10-09 RX ORDER — CEPHALEXIN 500 MG/1
500 CAPSULE ORAL 2 TIMES DAILY
Qty: 20 CAPSULE | Refills: 0 | Status: SHIPPED | OUTPATIENT
Start: 2020-10-09 | End: 2020-10-19

## 2020-10-09 ASSESSMENT — ENCOUNTER SYMPTOMS
CONSTIPATION: 1
COUGH: 0
ALLERGIC/IMMUNOLOGIC NEGATIVE: 1
BACK PAIN: 1
VOMITING: 0
DIARRHEA: 0
COLOR CHANGE: 0
RESPIRATORY NEGATIVE: 1
EYES NEGATIVE: 1
NAUSEA: 0

## 2020-10-09 ASSESSMENT — PATIENT HEALTH QUESTIONNAIRE - PHQ9
1. LITTLE INTEREST OR PLEASURE IN DOING THINGS: 0
SUM OF ALL RESPONSES TO PHQ9 QUESTIONS 1 & 2: 0
2. FEELING DOWN, DEPRESSED OR HOPELESS: 0
SUM OF ALL RESPONSES TO PHQ QUESTIONS 1-9: 0
SUM OF ALL RESPONSES TO PHQ QUESTIONS 1-9: 0

## 2020-10-11 LAB
CULTURE: ABNORMAL
CULTURE: ABNORMAL
Lab: ABNORMAL
SPECIMEN DESCRIPTION: ABNORMAL

## 2020-12-15 ENCOUNTER — TELEPHONE (OUTPATIENT)
Dept: FAMILY MEDICINE CLINIC | Age: 85
End: 2020-12-15

## 2020-12-15 NOTE — TELEPHONE ENCOUNTER
pts son called and wants to know if he picks up a urine cup and brings a urine sample from patient  Can she get treated then?

## 2020-12-15 NOTE — TELEPHONE ENCOUNTER
Pt advised, she says she doesn't understand why you won't call in the antibiotics.  She says \"you people are putting me under such stress, I am not calling the urologist because of the covid\"   I explained to her that she you would need her to give a urine, to which she hung up on me

## 2020-12-15 NOTE — TELEPHONE ENCOUNTER
Again as stated at last appointment 10 days of antibiotics is not necessary for the treatment of UTI. She needs to follow-up with her Urologist as discussed and referred last appointment.

## 2020-12-15 NOTE — TELEPHONE ENCOUNTER
URINARY    Patient calling with symptoms of possible urinary tract infection  Symptoms include  Pressure,frequency    Symptoms have persisted for   started 2 days ago    When was their last UTI  2-3 months ago      *This condition is eligible for an eVisit. If not already active, sign patient up for MyChart to improve access and communication with the provider. *

## 2020-12-15 NOTE — TELEPHONE ENCOUNTER
Pt advised, she says she cannot come in because she does not have transportation. She says she has not been out of the house since she last saw you. She is willing to do a phone encounter. She says she would appreciate it if you could just send in a 10 day antibiotic.    \"It's the same thing I had before\"

## 2020-12-15 NOTE — TELEPHONE ENCOUNTER
Like I explained very thoroughly at the last appointment it is not safe or appropriate to blindly & repetitively treat a recurrent UTI without obtaining a culture. Her last visit showed TWO types of bacteria in her urine. We choose antibiotics based on culture results. In light of her history she really needs to be following with a Urologist, but for her acute symptoms today she would need appointment to check urine to make sure treatment is appropriate for the bacteria she is growing.

## 2020-12-16 LAB
BILIRUBIN, URINE: NEGATIVE
BLOOD, URINE: ABNORMAL
CLARITY: ABNORMAL
COLOR: YELLOW
GLUCOSE URINE: ABNORMAL
KETONES, URINE: NEGATIVE
LEUKOCYTE ESTERASE, URINE: POSITIVE
NITRITE, URINE: NEGATIVE
PH UA: ABNORMAL
PROTEIN UA: NEGATIVE
SPECIFIC GRAVITY, URINE: 1.01
UROBILINOGEN, URINE: NORMAL

## 2020-12-18 RX ORDER — CEPHALEXIN 500 MG/1
500 CAPSULE ORAL 2 TIMES DAILY
Qty: 14 CAPSULE | Refills: 0 | Status: SHIPPED | OUTPATIENT
Start: 2020-12-18 | End: 2020-12-25

## 2021-02-09 ENCOUNTER — TELEPHONE (OUTPATIENT)
Dept: FAMILY MEDICINE CLINIC | Age: 86
End: 2021-02-09

## 2021-06-09 ENCOUNTER — TELEPHONE (OUTPATIENT)
Dept: FAMILY MEDICINE CLINIC | Age: 86
End: 2021-06-09

## 2021-06-10 ENCOUNTER — OFFICE VISIT (OUTPATIENT)
Dept: PODIATRY | Age: 86
End: 2021-06-10
Payer: MEDICARE

## 2021-06-10 VITALS — BODY MASS INDEX: 38.67 KG/M2 | WEIGHT: 198 LBS

## 2021-06-10 DIAGNOSIS — M79.672 PAIN IN BOTH FEET: ICD-10-CM

## 2021-06-10 DIAGNOSIS — M79.671 PAIN IN BOTH FEET: ICD-10-CM

## 2021-06-10 DIAGNOSIS — I73.9 PERIPHERAL VASCULAR DISORDER (HCC): ICD-10-CM

## 2021-06-10 DIAGNOSIS — B35.1 DERMATOPHYTOSIS OF NAIL: Primary | ICD-10-CM

## 2021-06-10 DIAGNOSIS — I87.2 CHRONIC VENOUS INSUFFICIENCY: ICD-10-CM

## 2021-06-10 DIAGNOSIS — I89.0 LYMPHEDEMA OF BOTH LOWER EXTREMITIES: ICD-10-CM

## 2021-06-10 PROCEDURE — 1123F ACP DISCUSS/DSCN MKR DOCD: CPT | Performed by: PODIATRIST

## 2021-06-10 PROCEDURE — 1090F PRES/ABSN URINE INCON ASSESS: CPT | Performed by: PODIATRIST

## 2021-06-10 PROCEDURE — 1036F TOBACCO NON-USER: CPT | Performed by: PODIATRIST

## 2021-06-10 PROCEDURE — 11721 DEBRIDE NAIL 6 OR MORE: CPT | Performed by: PODIATRIST

## 2021-06-10 PROCEDURE — 99213 OFFICE O/P EST LOW 20 MIN: CPT | Performed by: PODIATRIST

## 2021-06-10 PROCEDURE — G8417 CALC BMI ABV UP PARAM F/U: HCPCS | Performed by: PODIATRIST

## 2021-06-10 PROCEDURE — G8427 DOCREV CUR MEDS BY ELIG CLIN: HCPCS | Performed by: PODIATRIST

## 2021-06-10 PROCEDURE — 4040F PNEUMOC VAC/ADMIN/RCVD: CPT | Performed by: PODIATRIST

## 2021-06-11 NOTE — PROGRESS NOTES
[facility]  [facility]  [address] [place] [address]  Dept: [phone]  Dept Fax: [phone]     PAIN PROGRESS NOTE  Date of patient's visit: 6/11/2021  Patient's Name:  Linsey Gautam YOB: 1931            Patient Care Team:  MARIVEL Amaya CNP as PCP - General (Nurse Practitioner)  MARIVEL Amaya CNP as PCP - [facility]  Otis Her MD as Consulting Physician (Internal Medicine)  Angie Allen MD (Vascular Surgery)  Esperanza Jenkins MD as Surgeon (Cardiology)  Paige Vail DPM as Physician (Podiatry)      Chief Complaint   Patient presents with    Nail Problem       Subjective: This Linsey Gautam comes to clinic for foot and nail care. Pt currently has complaint of thickened, painful, elongated nails that he/she cannot manage by themselves. Pt. Relates pain to nails with shoe gear. Pt's primary care physician is MARIVEL Amaya CNP last seen 10/09/2020. Pt has a new complaint of swelling to the right and left leg that is getting worse.   Pt relates to being on the feet more and sleeping in a chair    Past Medical History:   Diagnosis Date    Atrial fibrillation (HCC)     Cancer ([address]     breast    GERD (gastroesophageal reflux disease)     Hypertension     Osteoarthritis     Sick sinus syndrome (HCC)        Allergies   Allergen Reactions    Neosporin [Neomycin-Polymyxin-Gramicidin] Rash    Bactrim      GI upset, headache    Ciprofloxacin      GI upset , headache    Codeine     Erythromycin     Nalbuphine Nausea Only    Nubain [Nalbuphine Hcl]      Current Outpatient Medications on File Prior to Visit   Medication Sig Dispense Refill    Multiple Vitamins-Minerals (MULTIVITAMIN ADULT PO) Take by mouth      Multiple Vitamins-Minerals (VITAMIN D3 COMPLETE PO) Take by mouth      meclizine (ANTIVERT) 12.5 MG tablet Take 1 tablet by mouth 2 times daily as needed for Dizziness 30 tablet 1    polyethylene glycol (MIRALAX) powder Take 17 g by mouth daily as needed (constipation) 250 g 6    Esomeprazole Magnesium (NEXIUM 24HR) 20 MG TBEC Take 20 mg by mouth daily 90 tablet 3    diclofenac (PENNSAID) 2 % SOLN Place 2 g onto the skin 2 times daily 112 g 0    apixaban (ELIQUIS) 5 MG TABS tablet Take 5 mg by mouth      nystatin (MYCOSTATIN) 828321 UNIT/GM cream Apply topically 2 times daily. 1 Tube 3    lisinopril (PRINIVIL;ZESTRIL) 5 MG tablet Take 5 mg by mouth daily.  propranolol (INDERAL) 20 MG tablet TAKE 1 TABLET THREE TIMES A DAY (Patient taking differently: 40 mg 2 times daily ) 270 tablet 0     No current facility-administered medications on file prior to visit. Review of Systems. Review of Systems:   History obtained from chart review and the patient  General ROS: negative for - chills, fatigue, fever, night sweats or weight gain  Constitutional: Negative for chills, diaphoresis, fatigue, fever and unexpected weight change. Musculoskeletal: Positive for arthralgias, gait problem and joint swelling. Neurological ROS: negative for - behavioral changes, confusion, headaches or seizures. Negative for weakness and numbness. Dermatological ROS: negative for - mole changes, rash  Cardiovascular: Negative for leg swelling. Gastrointestinal: Negative for constipation, diarrhea, nausea and vomiting. Objective:  Dermatologic Exam:  Skin lesion/ulceration Absent . Skin No rashes or nodules noted. .   Skin is thin, with flaky sloughing skin as well as decreased hair growth to the lower leg  Small red hemosiderin deposits seen dorsal foot   Musculoskeletal:     1st MPJ ROM decreased, Bilateral.  Muscle strength 5/5, Bilateral.  Pain present upon palpation of toenails 1-5, Bilateral. decreased medial longitudinal arch, Bilateral.  Ankle ROM decreased,Bilateral.    Dorsally contracted digits present digits 2, Bilateral.     Vascular: Pitting 2+ edema noted to the right and left foot to the level of the midleg. DP pulses 1/4 bilateral.  PT pulses 0/4 bilateral.   CFT <5 seconds, Bilateral.  Hair growth absent to the level of the digits, Bilateral.  Edema present, Bilateral.  Varicosities absent, Bilateral. Erythema absent, Bilateral    Neurological: Sensation diminshed to light touch to level of digits, Bilateral.  Protective sensation intact 6/10 sites via 5.07/10g [place] Monofilament, Bilateral.  negative Tinel's, Bilateral.  negative Valleix sign, Bilateral.      Integument: Warm, dry, supple, Bilateral.  Open lesion absent, Bilateral.  Interdigital maceration absent to web spaces 4, Bilateral.  Nails 1-5 left and 1-5 right thickened > 3.0 mm, dystrophic and crumbly, discolored with subungual debris. Fissures absent, Bilateral.   General: AAO x 3 in NAD. Derm  Toenail Description  Sites of Onychomycosis Involvement (Check affected area)  [x] [x] [x] [x] [x] [x] [x] [x] [x] [x]  5 4 3 2 1 1 2 3 4 5                          Right                                        Left    Thickness  [x] [x] [x] [x] [x] [x] [x] [x] [x] [x]  5 4 3 2 1 1 2 3 4 5                         Right                                        Left    Dystrophic Changes   [x] [x] [x] [x] [x] [x] [x] [x] [x] [x]  5 4 3 2 1 1 2 3 4 5                         Right                                        Left    Color  [x] [x] [x] [x] [x] [x] [x] [x] [x] [x]  5 4 3 2 1 1 2 3 4 5                          Right                                        Left    Incurvation/Ingrowin   [] [] [] [] [] [] [] [] [] []  5 4 3 2 1 1 2 3 4 5                         Right                                        Left    Inflammation/Pain   [x] [x] [x] [x] [x] [x] [x] [x] [x] [x]  5 4 3  2 1 1 2 3 4 5                         Right                                        Left       Nails are painful 1-10 with direct palpation.       Q7   []Yes  []No                Q8   [x]Yes  []No                     Q9   []Yes []No  Assessment:  80 y.o. female with:    Diagnosis Orders   1. Dermatophytosis of nail  95991 - [place] DEBR[place] OF NAILS, 6 OR MORE   2. Peripheral vascular disorder (HCC)  31591 - [place] DEBR[place]EMENT OF NAILS, 6 OR MORE   3. Chronic venous insufficiency  91230 - [place] DEBR[place] OF NAILS, 6 OR MORE   4. Pain in both feet  58771 - [place] DEBR[place] OF NAILS, 6 OR MORE   5. Lymphedema of both lower extremities  60608 - [place] DEBR[place]EMENT OF NAILS, 6 OR MORE       Plan:   Pt was evaluated and examined. Patient was given personalized discharge instructions. For the leg swelling  Rx given for compression stockings to be worn during the day. Pt to elevate at night above the heart       Nails 1-10 were debrided in length and thickness sharply with a nail nipper and  without incident. Pt will follow up in 9 weeks or sooner if any problems arise. Diagnosis was discussed with the pt and all of their questions were answered in detail. Proper foot hygiene and care was discussed with the pt. Patient to check feet daily and contact the office with any questions/problems/concerns. Other comorbidity noted and will be managed by PCP. Pain waiver discussed with patient and confirmed. All labs were reviewed and all imagining including the above findings were reviewed PRIOR to the patients arrival and with the patient today. Previous patient encounter was reviewed. Encounters from the patients other medical providers were reviewed and noted. Time was spent educating the patient and their families/caregivers on proper care of the feet and ankles. All the above diagnosis were addressed at todays visit and all questions were answered.   A total of 25 minutes was spent with this patients encounter which included charting after the patients visit    6/10/2021      Electronically signed by Cate Wilhelm DPM on 6/11/2021 at 2:12 PM  6/10/2021

## 2021-09-02 ENCOUNTER — OFFICE VISIT (OUTPATIENT)
Dept: PODIATRY | Age: 86
End: 2021-09-02
Payer: MEDICARE

## 2021-09-02 VITALS — BODY MASS INDEX: 37.38 KG/M2 | WEIGHT: 198 LBS | HEIGHT: 61 IN

## 2021-09-02 DIAGNOSIS — M79.671 PAIN IN BOTH FEET: ICD-10-CM

## 2021-09-02 DIAGNOSIS — I89.0 LYMPHEDEMA OF BOTH LOWER EXTREMITIES: ICD-10-CM

## 2021-09-02 DIAGNOSIS — I73.9 PERIPHERAL VASCULAR DISORDER (HCC): ICD-10-CM

## 2021-09-02 DIAGNOSIS — M79.672 PAIN IN BOTH FEET: ICD-10-CM

## 2021-09-02 DIAGNOSIS — I87.2 CHRONIC VENOUS INSUFFICIENCY: ICD-10-CM

## 2021-09-02 DIAGNOSIS — B35.1 DERMATOPHYTOSIS OF NAIL: Primary | ICD-10-CM

## 2021-09-02 PROCEDURE — 11721 DEBRIDE NAIL 6 OR MORE: CPT | Performed by: PODIATRIST

## 2021-09-02 NOTE — PROGRESS NOTES
[facility] PHYSICIANS  [facility]  [address] [place] [address]  Dept: [phone]  Dept Fax: [phone]     PAIN PROGRESS NOTE  Date of patient's visit: 9/2/2021  Patient's Name:  Blake Hancock YOB: 1931            Patient Care Team:  MARIVEL Moreno CNP as PCP - General (Nurse Practitioner)  MARIVEL Moreno CNP as PCP - REHABILITATION [facility][1] [facility]  Eugene Low MD as Consulting Physician (Internal Medicine)  Howard Escobar MD (Vascular Surgery)  Josse Whitney MD as Surgeon (Cardiology)  Stefany Sun DPM as Physician (Podiatry)      Chief Complaint   Patient presents with    Foot Pain     bilateral     Nail Problem     [place]       Subjective: This Blake Hancock comes to clinic for foot and nail care. Pt currently has complaint of thickened, painful, elongated nails that he/she cannot manage by themselves. Pt. Relates pain to nails with shoe gear. Pt's primary care physician is MARIVEL Moreno CNP last seen 2/9/21. Pt has a new complaint of none today .     Past Medical History:   Diagnosis Date    Atrial fibrillation (HCC)     Cancer (HCC)     breast    GERD (gastroesophageal reflux disease)     Hypertension     Osteoarthritis     Sick sinus syndrome (HCC)        Allergies   Allergen Reactions    Neosporin [Neomycin-Polymyxin-Gramicidin] Rash    Bactrim      GI upset, headache    Ciprofloxacin      GI upset , headache    Codeine     Erythromycin     Nalbuphine Nausea Only    Nubain [Nalbuphine Hcl]      Current Outpatient Medications on File Prior to Visit   Medication Sig Dispense Refill    Multiple Vitamins-Minerals (MULTIVITAMIN ADULT PO) Take by mouth      Multiple Vitamins-Minerals (VITAMIN D3 COMPLETE PO) Take by mouth      meclizine (ANTIVERT) 12.5 MG tablet Take 1 tablet by mouth 2 times daily as needed for Dizziness 30 tablet 1    polyethylene glycol (MIRALAX) powder Take 17 g by mouth daily as needed (constipation) 250 g 6    Esomeprazole Magnesium (NEXIUM 24HR) 20 MG TBEC Take 20 mg by mouth daily 90 tablet 3    diclofenac (PENNSAID) 2 % SOLN Place 2 g onto the skin 2 times daily 112 g 0    apixaban (ELIQUIS) 5 MG TABS tablet Take 5 mg by mouth      nystatin (MYCOSTATIN) 635133 UNIT/GM cream Apply topically 2 times daily. 1 Tube 3    lisinopril (PRINIVIL;ZESTRIL) 5 MG tablet Take 5 mg by mouth daily.  propranolol (INDERAL) 20 MG tablet TAKE 1 TABLET THREE TIMES A DAY (Patient taking differently: 40 mg 2 times daily ) 270 tablet 0     No current facility-administered medications on file prior to visit. Review of Systems. Review of Systems:   History obtained from chart review and the patient  General ROS: negative for - chills, fatigue, fever, night sweats or weight gain  Constitutional: Negative for chills, diaphoresis, fatigue, fever and unexpected weight change. Musculoskeletal: Positive for arthralgias, gait problem and joint swelling. Neurological ROS: negative for - behavioral changes, confusion, headaches or seizures. Negative for weakness and numbness. Dermatological ROS: negative for - mole changes, rash  Cardiovascular: Negative for leg swelling. Gastrointestinal: Negative for constipation, diarrhea, nausea and vomiting. Objective:  Dermatologic Exam:  Skin lesion/ulceration Absent . Skin No rashes or nodules noted. .   Skin is thin, with flaky sloughing skin as well as decreased hair growth to the lower leg  Small red hemosiderin deposits seen dorsal foot   Musculoskeletal:     1st MPJ ROM decreased, Bilateral.  Muscle strength 5/5, Bilateral.  Pain present upon palpation of toenails 1-5, Bilateral. decreased medial longitudinal arch, Bilateral.  Ankle ROM decreased,Bilateral.    Dorsally contracted digits present digits 2, Bilateral.     Vascular: DP pulses 1/4 bilateral.  PT pulses 0/4 bilateral.   CFT <5 seconds, Bilateral.  Hair growth absent to the level of the digits, Bilateral.  Edema present, Bilateral.  Varicosities absent, Bilateral. Erythema absent, Bilateral    Neurological: Sensation diminshed to light touch to level of digits, Bilateral.  Protective sensation intact 6/10 sites via 5.07/10g [place]odi Monofilament, Bilateral.  negative Tinel's, Bilateral.  negative Valleix sign, Bilateral.      Integument: Warm, dry, supple, Bilateral.  Open lesion absent, Bilateral.  Interdigital maceration absent to web spaces 4, Bilateral.  Nails 1-5 left and 1-5 right thickened > 3.0 mm, dystrophic and crumbly, discolored with subungual debris. Fissures absent, Bilateral.   General: AAO x 3 in NAD. Derm  Toenail Description  Sites of Onychomycosis Involvement (Check affected area)  [x] [x] [x] [x] [x] [x] [x] [x] [x] [x]  5 4 3 2 1 1 2 3 4 5                          Right                                        Left    Thickness  [x] [x] [x] [x] [x] [x] [x] [x] [x] [x]  5 4 3 2 1 1 2 3 4 5                         Right                                        Left    Dystrophic Changes   [x] [x] [x] [x] [x] [x] [x] [x] [x] [x]  5 4 3 2 1 1 2 3 4 5                         Right                                        Left    Color  [x] [x] [x] [x] [x] [x] [x] [x] [x] [x]  5 4 3 2 1 1 2 3 4 5                          Right                                        Left    Incurvation/Ingrowin   [] [] [] [] [] [] [] [] [] []  5 4 3 2 1 1 2 3 4 5                         Right                                        Left    Inflammation/Pain   [x] [x] [x] [x] [x] [x] [x] [x] [x] [x]  5 4 3  2 1 1 2 3 4 5                         Right                                        Left       Nails are painful 1-10 with direct palpation. Q7   []Yes  []No                Q8   [x]Yes  []No                     Q9   []Yes    []No  Assessment:  80 y.o. female with:    Diagnosis Orders   1. Dermatophytosis of nail  32267 - [place] DEBRIDEMENT OF NAILS, 6 OR MORE   2.  Peripheral vascular disorder ([address]  96564 - [place] DEBRIDEMENT OF NAILS, 6 OR MORE   3. Chronic venous insufficiency  25439 - [place] DEBRIDEMENT OF NAILS, 6 OR MORE   4. Pain in both feet  58579 - [place] DEBRIDEMENT OF NAILS, 6 OR MORE   5. Lymphedema of both lower extremities  60794 - [place] DEBRIDEMENT OF NAILS, 6 OR MORE           Plan:   Pt was evaluated and examined. Patient was given personalized discharge instructions. Nails 1-10 were debrided in length and thickness sharply with a nail nipper and  without incident. Pt will follow up in 9 weeks or sooner if any problems arise. Diagnosis was discussed with the pt and all of their questions were answered in detail. Proper foot hygiene and care was discussed with the pt. Patient to check feet daily and contact the office with any questions/problems/concerns. Other comorbidity noted and will be managed by PCP. Pain waiver discussed with patient and confirmed.    9/2/2021      Electronically signed by Elisabeth Rudd DPM on 9/2/2021 at 1:05 PM  9/2/2021

## 2021-09-02 NOTE — PATIENT INSTRUCTIONS
Schedule a Vaccine  When you qualify to receive the vaccine, call the [facility]) COVID-19 Vaccination Hotline to schedule your appointment or to get additional information about the [facility]) locations which are offering the COVID-19 vaccine. To be 94% effective, it's important that you receive two doses of one of the COVID-19 vaccines. -If you are receiving the Arevalo Peter vaccine, your second shot will be scheduled as close to 21 days after the first shot as possible. -If you are receiving the Moderna vaccine, your second shot will be scheduled as close to 28 days after the first shot as possible. [facility]) COVID-19 Vaccination Hotline: [phone]    Links to [facility]) website and [facility] website:    [employer]-monitoring-coronavirus-covid-19/covid-19-vaccine/[email or web address]    [email or web address]

## 2021-11-04 ENCOUNTER — OFFICE VISIT (OUTPATIENT)
Dept: PODIATRY | Age: 86
End: 2021-11-04
Payer: MEDICARE

## 2021-11-04 VITALS — BODY MASS INDEX: 30.01 KG/M2 | HEIGHT: 68 IN | WEIGHT: 198 LBS | RESPIRATION RATE: 16 BRPM

## 2021-11-04 DIAGNOSIS — M79.672 PAIN IN BOTH FEET: ICD-10-CM

## 2021-11-04 DIAGNOSIS — B35.1 DERMATOPHYTOSIS OF NAIL: Primary | ICD-10-CM

## 2021-11-04 DIAGNOSIS — I73.9 PERIPHERAL VASCULAR DISORDER (HCC): ICD-10-CM

## 2021-11-04 DIAGNOSIS — M79.671 PAIN IN BOTH FEET: ICD-10-CM

## 2021-11-04 PROCEDURE — 11721 DEBRIDE NAIL 6 OR MORE: CPT | Performed by: PODIATRIST

## 2021-11-04 RX ORDER — NITROFURANTOIN MACROCRYSTALS 100 MG/1
CAPSULE ORAL
COMMUNITY
Start: 2021-09-15

## 2021-11-04 NOTE — PROGRESS NOTES
[facility] PHYSICIANS  [facility]  [address] [place] [address]  Dept: [phone]  Dept Fax: [phone]     PAIN PROGRESS NOTE  Date of patient's visit: 11/4/2021  Patient's Name:  Jaz Reynoso YOB: 1931            Patient Care Team:  MARIVEL Shoemaker CNP as PCP - General (Nurse Practitioner)  MARIVEL Shoemaker CNP as PCP - [facility]  Zach Watts MD as Consulting Physician (Internal Medicine)  Joelle Mathew MD (Vascular Surgery)  Negra Lai MD as Surgeon (Cardiology)  Benita Hopson DPM as Physician (Podiatry)      Chief Complaint   Patient presents with    Nail Problem       Subjective: This Jaz Reynoso comes to clinic for foot and nail care. Pt currently has complaint of thickened, painful, elongated nails that he/she cannot manage by themselves. Pt. Relates pain to nails with shoe gear. Pt's primary care physician is MARIVEL Shoemaker CNP last seen2/9/2021.   Pt has a new complaint of none  Past Medical History:   Diagnosis Date    Atrial fibrillation (HCC)     Cancer (HCC)     breast    GERD (gastroesophageal reflux disease)     Hypertension     Osteoarthritis     Sick sinus syndrome (HCC)        Allergies   Allergen Reactions    Neosporin [Neomycin-Polymyxin-Gramicidin] Rash    Bactrim      GI upset, headache    Ciprofloxacin      GI upset , headache    Codeine     Erythromycin     Nalbuphine Nausea Only    Nubain [Nalbuphine Hcl]      Current Outpatient Medications on File Prior to Visit   Medication Sig Dispense Refill    nitrofurantoin (MACRODANTIN) 100 MG capsule       Multiple Vitamins-Minerals (MULTIVITAMIN ADULT PO) Take by mouth      Multiple Vitamins-Minerals (VITAMIN D3 COMPLETE PO) Take by mouth      meclizine (ANTIVERT) 12.5 MG tablet Take 1 tablet by mouth 2 times daily as needed for Dizziness 30 tablet 1    polyethylene glycol (MIRALAX) powder Take 17 g by mouth daily as needed (constipation) 250 g 6    Esomeprazole Magnesium (NEXIUM 24HR) 20 MG TBEC Take 20 mg by mouth daily 90 tablet 3    diclofenac (PENNSAID) 2 % SOLN Place 2 g onto the skin 2 times daily 112 g 0    apixaban (ELIQUIS) 5 MG TABS tablet Take 5 mg by mouth      nystatin (MYCOSTATIN) 343662 UNIT/GM cream Apply topically 2 times daily. 1 Tube 3    lisinopril (PRINIVIL;ZESTRIL) 5 MG tablet Take 5 mg by mouth daily.  propranolol (INDERAL) 20 MG tablet TAKE 1 TABLET THREE TIMES A DAY (Patient taking differently: 40 mg 2 times daily ) 270 tablet 0     No current facility-administered medications on file prior to visit. Review of Systems. Review of Systems:   History obtained from chart review and the patient  General ROS: negative for - chills, fatigue, fever, night sweats or weight gain  Constitutional: Negative for chills, diaphoresis, fatigue, fever and unexpected weight change. Musculoskeletal: Positive for arthralgias, gait problem and joint swelling. Neurological ROS: negative for - behavioral changes, confusion, headaches or seizures. Negative for weakness and numbness. Dermatological ROS: negative for - mole changes, rash  Cardiovascular: Negative for leg swelling. Gastrointestinal: Negative for constipation, diarrhea, nausea and vomiting. Objective:  Dermatologic Exam:  Skin lesion/ulceration Absent . Skin No rashes or nodules noted. .   Skin is thin, with flaky sloughing skin as well as decreased hair growth to the lower leg  Small red hemosiderin deposits seen dorsal foot   Musculoskeletal:     1st MPJ ROM decreased, Bilateral.  Muscle strength 5/5, Bilateral.  Pain present upon palpation of toenails 1-5, Bilateral. decreased medial longitudinal arch, Bilateral.  Ankle ROM decreased,Bilateral.    Dorsally contracted digits present digits 2, Bilateral.     Vascular: DP pulses 1/4 bilateral.  PT pulses 0/4 bilateral.   CFT <5 seconds, Bilateral.  Hair growth absent to the level of the digits, Bilateral.  Edema present, Bilateral.  Varicosities absent, Bilateral. Erythema absent, Bilateral    Neurological: Sensation diminshed to light touch to level of digits, Bilateral.  Protective sensation intact 6/10 sites via 5.07/10g [place]-Codi Monofilament, Bilateral.  negative Tinel's, Bilateral.  negative Valleix sign, Bilateral.      Integument: Warm, dry, supple, Bilateral.  Open lesion absent, Bilateral.  Interdigital maceration absent to web spaces 4, Bilateral.  Nails 1-5 left and 1-5 right thickened > 3.0 mm, dystrophic and crumbly, discolored with subungual debris. Fissures absent, Bilateral.   General: AAO x 3 in NAD. Derm  Toenail Description  Sites of Onychomycosis Involvement (Check affected area)  [x] [x] [x] [x] [x] [x] [x] [x] [x] [x]  5 4 3 2 1 1 2 3 4 5                          Right                                        Left    Thickness  [x] [x] [x] [x] [x] [x] [x] [x] [x] [x]  5 4 3 2 1 1 2 3 4 5                         Right                                        Left    Dystrophic Changes   [x] [x] [x] [x] [x] [x] [x] [x] [x] [x]  5 4 3 2 1 1 2 3 4 5                         Right                                        Left    Color  [x] [x] [x] [x] [x] [x] [x] [x] [x] [x]  5 4 3 2 1 1 2 3 4 5                          Right                                        Left    Incurvation/Ingrowin   [] [] [] [] [] [] [] [] [] []  5 4 3 2 1 1 2 3 4 5                         Right                                        Left    Inflammation/Pain   [x] [x] [x] [x] [x] [x] [x] [x] [x] [x]  5 4 3  2 1 1 2 3 4 5                         Right                                        Left       Nails are painful 1-10 with direct palpation. Q7   []Yes  []No                Q8   [x]Yes  []No                     Q9   []Yes    []No  Assessment:  80 y.o. female with:    Diagnosis Orders   1. Dermatophytosis of nail  85846 - [place] DEBRIDEMENT OF NAILS, 6 OR MORE   2.  [facility]  55448 - [place] DEBRIDEMENT OF NAILS, 6 OR MORE   3. Pain in both feet  37491 - [place] DEBRIDEMENT OF NAILS, 6 OR MORE         Plan:   Pt was evaluated and examined. Patient was given personalized discharge instructions. Nails 1-10 were debrided in length and thickness sharply with a nail nipper and  without incident. Pt will follow up in 9 weeks or sooner if any problems arise. Diagnosis was discussed with the pt and all of their questions were answered in detail. Proper foot hygiene and care was discussed with the pt. Patient to check feet daily and contact the office with any questions/problems/concerns. Other comorbidity noted and will be managed by PCP. Pain waiver discussed with patient and confirmed.    11/4/2021      Electronically signed by Nik Lane DPM on 11/4/2021 at 1:23 PM  11/4/2021

## 2022-02-10 ENCOUNTER — OFFICE VISIT (OUTPATIENT)
Dept: PODIATRY | Age: 87
End: 2022-02-10
Payer: MEDICARE

## 2022-02-10 VITALS — RESPIRATION RATE: 16 BRPM | BODY MASS INDEX: 30.01 KG/M2 | HEIGHT: 68 IN | WEIGHT: 198 LBS

## 2022-02-10 DIAGNOSIS — I73.9 PERIPHERAL VASCULAR DISORDER (HCC): ICD-10-CM

## 2022-02-10 DIAGNOSIS — I89.0 LYMPHEDEMA OF BOTH LOWER EXTREMITIES: ICD-10-CM

## 2022-02-10 DIAGNOSIS — M79.671 PAIN IN BOTH FEET: ICD-10-CM

## 2022-02-10 DIAGNOSIS — B35.1 DERMATOPHYTOSIS OF NAIL: Primary | ICD-10-CM

## 2022-02-10 DIAGNOSIS — M79.672 PAIN IN BOTH FEET: ICD-10-CM

## 2022-02-10 PROCEDURE — 99999 PR OFFICE/OUTPT VISIT,PROCEDURE ONLY: CPT | Performed by: PODIATRIST

## 2022-02-10 PROCEDURE — 11721 DEBRIDE NAIL 6 OR MORE: CPT | Performed by: PODIATRIST

## 2022-02-10 RX ORDER — LISINOPRIL 10 MG/1
TABLET ORAL
COMMUNITY
Start: 2022-01-08 | End: 2022-02-10

## 2022-02-10 NOTE — PROGRESS NOTES
 polyethylene glycol (MIRALAX) powder Take 17 g by mouth daily as needed (constipation) 250 g 6    Esomeprazole Magnesium (NEXIUM 24HR) 20 MG TBEC Take 20 mg by mouth daily 90 tablet 3    diclofenac (PENNSAID) 2 % SOLN Place 2 g onto the skin 2 times daily 112 g 0    apixaban (ELIQUIS) 5 MG TABS tablet Take 5 mg by mouth      nystatin (MYCOSTATIN) 815440 UNIT/GM cream Apply topically 2 times daily. 1 Tube 3    lisinopril (PRINIVIL;ZESTRIL) 5 MG tablet Take 5 mg by mouth daily.  propranolol (INDERAL) 20 MG tablet TAKE 1 TABLET THREE TIMES A DAY (Patient taking differently: 40 mg 2 times daily ) 270 tablet 0     No current facility-administered medications on file prior to visit. Review of Systems. Review of Systems:   History obtained from chart review and the patient  General ROS: negative for - chills, fatigue, fever, night sweats or weight gain  Constitutional: Negative for chills, diaphoresis, fatigue, fever and unexpected weight change. Musculoskeletal: Positive for arthralgias, gait problem and joint swelling. Neurological ROS: negative for - behavioral changes, confusion, headaches or seizures. Negative for weakness and numbness. Dermatological ROS: negative for - mole changes, rash  Cardiovascular: Negative for leg swelling. Gastrointestinal: Negative for constipation, diarrhea, nausea and vomiting. Objective:  Dermatologic Exam:  Skin lesion/ulceration Absent . Skin No rashes or nodules noted. .   Skin is thin, with flaky sloughing skin as well as decreased hair growth to the lower leg  Small red hemosiderin deposits seen dorsal foot   Musculoskeletal:     1st MPJ ROM decreased, Bilateral.  Muscle strength 5/5, Bilateral.  Pain present upon palpation of toenails 1-5, Bilateral. decreased medial longitudinal arch, Bilateral.  Ankle ROM decreased,Bilateral.    Dorsally contracted digits present digits 2, Bilateral.     Vascular: DP pulses 1/4 bilateral.  PT pulses 0/4 bilateral.   CFT <5 seconds, Bilateral.  Hair growth absent to the level of the digits, Bilateral.  Edema present, Bilateral.  Varicosities absent, Bilateral. Erythema absent, Bilateral    Neurological: Sensation diminshed to light touch to level of digits, Bilateral.  Protective sensation intact 6/10 sites via 5.07/10g [place]-Codi Monofilament, Bilateral.  negative Tinel's, Bilateral.  negative Valleix sign, Bilateral.      Integument: Warm, dry, supple, Bilateral.  Open lesion absent, Bilateral.  Interdigital maceration absent to web spaces 4, Bilateral.  Nails 1-5 left and 1-5 right thickened > 3.0 mm, dystrophic and crumbly, discolored with subungual debris. Fissures absent, Bilateral.   General: AAO x 3 in NAD. Derm  Toenail Description  Sites of Onychomycosis Involvement (Check affected area)  [x] [x] [x] [x] [x] [x] [x] [x] [x] [x]  5 4 3 2 1 1 2 3 4 5                          Right                                        Left    Thickness  [x] [x] [x] [x] [x] [x] [x] [x] [x] [x]  5 4 3 2 1 1 2 3 4 5                         Right                                        Left    Dystrophic Changes   [x] [x] [x] [x] [x] [x] [x] [x] [x] [x]  5 4 3 2 1 1 2 3 4 5                         Right                                        Left    Color  [x] [x] [x] [x] [x] [x] [x] [x] [x] [x]  5 4 3 2 1 1 2 3 4 5                          Right                                        Left    Incurvation/Ingrowin   [] [] [] [] [] [] [] [] [] []  5 4 3 2 1 1 2 3 4 5                         Right                                        Left    Inflammation/Pain   [x] [x] [x] [x] [x] [x] [x] [x] [x] [x]  5 4 3  2 1 1 2 3 4 5                         Right                                        Left       Nails are painful 1-10 with direct palpation. Q7   []Yes  []No                Q8   [x]Yes  []No                     Q9   []Yes    []No  Assessment:  80 y.o. female with:    Diagnosis Orders   1.  Dermatophytosis of nail 10114 - [place] DEBRIDEMENT OF NAILS, 6 OR MORE   2. Peripheral vascular disorder (HCC)  07274 - [place] DEBRIDEMENT OF NAILS, 6 OR MORE   3. Pain in both feet  11674 - [place] DEBRIDEMENT OF NAILS, 6 OR MORE   4. Lymphedema of both lower extremities  98620 - [place] DEBRIDEMENT OF NAILS, 6 OR MORE         Plan:   Pt was evaluated and examined. Patient was given personalized discharge instructions. Nails 1-10 were debrided in length and thickness sharply with a nail nipper and  without incident. Pt will follow up in 9 weeks or sooner if any problems arise. Diagnosis was discussed with the pt and all of their questions were answered in detail. Proper foot hygiene and care was discussed with the pt. Patient to check feet daily and contact the office with any questions/problems/concerns. Other comorbidity noted and will be managed by PCP. Pain waiver discussed with patient and confirmed.    2/10/2022      Electronically signed by Travis Grissom DPM on 2/10/2022 at 1:11 PM  2/10/2022

## 2022-02-17 ENCOUNTER — OFFICE VISIT (OUTPATIENT)
Dept: PODIATRY | Age: 87
End: 2022-02-17
Payer: MEDICARE

## 2022-02-17 VITALS — WEIGHT: 198 LBS | HEIGHT: 68 IN | RESPIRATION RATE: 16 BRPM | BODY MASS INDEX: 30.01 KG/M2

## 2022-02-17 DIAGNOSIS — I87.2 CHRONIC VENOUS INSUFFICIENCY: ICD-10-CM

## 2022-02-17 DIAGNOSIS — M79.671 PAIN IN BOTH FEET: ICD-10-CM

## 2022-02-17 DIAGNOSIS — B35.3 TINEA PEDIS OF RIGHT FOOT: ICD-10-CM

## 2022-02-17 DIAGNOSIS — I73.9 PERIPHERAL VASCULAR DISORDER (HCC): Primary | ICD-10-CM

## 2022-02-17 DIAGNOSIS — M79.672 PAIN IN BOTH FEET: ICD-10-CM

## 2022-02-17 PROCEDURE — 1090F PRES/ABSN URINE INCON ASSESS: CPT | Performed by: PODIATRIST

## 2022-02-17 PROCEDURE — G8417 CALC BMI ABV UP PARAM F/U: HCPCS | Performed by: PODIATRIST

## 2022-02-17 PROCEDURE — G8484 FLU IMMUNIZE NO ADMIN: HCPCS | Performed by: PODIATRIST

## 2022-02-17 PROCEDURE — 99213 OFFICE O/P EST LOW 20 MIN: CPT | Performed by: PODIATRIST

## 2022-02-17 PROCEDURE — 4040F PNEUMOC VAC/ADMIN/RCVD: CPT | Performed by: PODIATRIST

## 2022-02-17 PROCEDURE — 1036F TOBACCO NON-USER: CPT | Performed by: PODIATRIST

## 2022-02-17 PROCEDURE — 1123F ACP DISCUSS/DSCN MKR DOCD: CPT | Performed by: PODIATRIST

## 2022-02-17 PROCEDURE — G8427 DOCREV CUR MEDS BY ELIG CLIN: HCPCS | Performed by: PODIATRIST

## 2022-02-17 RX ORDER — CLOTRIMAZOLE AND BETAMETHASONE DIPROPIONATE 10; .64 MG/G; MG/G
CREAM TOPICAL
Qty: 45 G | Refills: 1 | Status: SHIPPED | OUTPATIENT
Start: 2022-02-17 | End: 2022-10-11

## 2022-02-17 NOTE — PROGRESS NOTES
[facility] [address] [facility] PODIATRY [facility]  [address] [place] [id number]  Dept: [phone]  Dept Fax: [phone]    RETURN WOUND CARE PROGRESS NOTE  Date of patient's visit: 2022  Patient's Name:  Reyes Penny YOB: 1931            Patient Care Team:  MARIVEL Ye CNP as PCP - General (Nurse Practitioner)  MARIVEL Ye CNP as PCP - [facility]  Alena Rajput MD as Consulting Physician (Internal Medicine)  Estela Almaraz MD (Vascular Surgery)  Kathy Martinez MD as Surgeon (Cardiology)  Lisset Goldman DPM as Physician (Podiatry)      Chief Complaint   Patient presents with    Wound Check     RLE, Alma Plume boot change       Shakila Bettencourt  AGE: 80 y.o. GENDER: female  : 1931  TODAY'S DATE:  2022  Pt's primary care physician is MARIVEL Ye CNP last seen 2021  Subjective:       Reyes Penny is a 80 y.o. female presents to the office today for follow up of an ulceration. Denies F/C/N/V.   Wound Type:diabetic and pressure  Wound Location:right leg  Modifying factors:chronic pressure and decreased mobility            No results found for: LABA1C    ALLERGIES    Allergies   Allergen Reactions    Neosporin [Neomycin-Polymyxin-Gramicidin] Rash    Bactrim      GI upset, headache    Ciprofloxacin      GI upset , headache    Codeine     Erythromycin     Nalbuphine Nausea Only    Nubain [Nalbuphine Hcl]        Medications:    Current Outpatient Medications:     nitrofurantoin (MACRODANTIN) 100 MG capsule, , Disp: , Rfl:     Multiple Vitamins-Minerals (MULTIVITAMIN ADULT PO), Take by mouth, Disp: , Rfl:     Multiple Vitamins-Minerals (VITAMIN D3 COMPLETE PO), Take by mouth, Disp: , Rfl:     meclizine (ANTIVERT) 12.5 MG tablet, Take 1 tablet by mouth 2 times daily as needed for Dizziness, Disp: 30 tablet, Rfl: 1    polyethylene glycol (MIRALAX) powder, Take 17 g by mouth daily as needed (constipation), Disp: 250 g, Rfl: 6    Esomeprazole Magnesium (NEXIUM 24HR) 20 MG TBEC, Take 20 mg by mouth daily, Disp: 90 tablet, Rfl: 3    diclofenac (PENNSAID) 2 % SOLN, Place 2 g onto the skin 2 times daily, Disp: 112 g, Rfl: 0    apixaban (ELIQUIS) 5 MG TABS tablet, Take 5 mg by mouth, Disp: , Rfl:     nystatin (MYCOSTATIN) 403744 UNIT/GM cream, Apply topically 2 times daily. , Disp: 1 Tube, Rfl: 3    lisinopril (PRINIVIL;ZESTRIL) 5 MG tablet, Take 5 mg by mouth daily. , Disp: , Rfl:     propranolol (INDERAL) 20 MG tablet, TAKE 1 TABLET THREE TIMES A DAY (Patient taking differently: 40 mg 2 times daily ), Disp: 270 tablet, Rfl: 0    Review of Systems  Review of Systems - History obtained from chart review and the patient  General ROS: negative for - chills, fatigue, fever, night sweats or weight gain  Constitutional: Negative for chills, diaphoresis, fatigue, fever and unexpected weight change. Musculoskeletal: Positive for arthralgias, gait problem and joint swelling. Neurological ROS: negative for - behavioral changes, confusion, headaches or seizures. Negative for weakness and numbness. Dermatological ROS: negative for - mole changes, rash  Cardiovascular: Negative for leg swelling. Gastrointestinal: Negative for constipation, diarrhea, nausea and vomiting. Objective:       Physical Exam:  Resp 16   Ht 5' 8\" (1.727 m)   Wt 198 lb (89.8 kg)   BMI 30.11 kg/m²     NV status remains unchanged since last visit. Wound #:   1    pressure ulcer: Stage III   right leg    Wound measurements:  #1  5 mm by 2 mm  by   1 mm         Wound Depth: partial thickness.      Drainage:    minimal Type: clear    Wound Bed status:   Granulation Tissue: 100%   Necrotic 0%  Type:   Epithelialization 100%   Hypergranular 0%   Periwound hyperkeratosis:  absent  Erythema absent  Odor:no  Maceration:no  Undermining/tract/tunneling:no  Culture taken:   no             Assessment:

## 2022-03-17 ENCOUNTER — OFFICE VISIT (OUTPATIENT)
Dept: PODIATRY | Age: 87
End: 2022-03-17
Payer: MEDICARE

## 2022-03-17 VITALS — WEIGHT: 198 LBS | HEIGHT: 68 IN | BODY MASS INDEX: 30.01 KG/M2

## 2022-03-17 DIAGNOSIS — L97.811 VENOUS STASIS ULCER OF OTHER PART OF RIGHT LOWER LEG LIMITED TO BREAKDOWN OF SKIN WITH VARICOSE VEINS (HCC): Primary | ICD-10-CM

## 2022-03-17 DIAGNOSIS — I83.018 VENOUS STASIS ULCER OF OTHER PART OF RIGHT LOWER LEG LIMITED TO BREAKDOWN OF SKIN WITH VARICOSE VEINS (HCC): Primary | ICD-10-CM

## 2022-03-17 PROCEDURE — G8427 DOCREV CUR MEDS BY ELIG CLIN: HCPCS | Performed by: PODIATRIST

## 2022-03-17 PROCEDURE — 99213 OFFICE O/P EST LOW 20 MIN: CPT | Performed by: PODIATRIST

## 2022-03-17 PROCEDURE — G8417 CALC BMI ABV UP PARAM F/U: HCPCS | Performed by: PODIATRIST

## 2022-03-17 PROCEDURE — 1036F TOBACCO NON-USER: CPT | Performed by: PODIATRIST

## 2022-03-17 PROCEDURE — 1090F PRES/ABSN URINE INCON ASSESS: CPT | Performed by: PODIATRIST

## 2022-03-17 PROCEDURE — 1123F ACP DISCUSS/DSCN MKR DOCD: CPT | Performed by: PODIATRIST

## 2022-03-17 PROCEDURE — 4040F PNEUMOC VAC/ADMIN/RCVD: CPT | Performed by: PODIATRIST

## 2022-03-17 PROCEDURE — G8484 FLU IMMUNIZE NO ADMIN: HCPCS | Performed by: PODIATRIST

## 2022-03-17 RX ORDER — CEFDINIR 300 MG/1
CAPSULE ORAL
COMMUNITY
Start: 2022-03-10 | End: 2022-10-11 | Stop reason: ALTCHOICE

## 2022-03-17 NOTE — PROGRESS NOTES
[address] [facility] PODIATRY [facility]  [address] [address] [place] [id number]  Dept: [phone]  Dept Fax: [phone]    RETURN WOUND CARE PROGRESS NOTE  Date of patient's visit: 3/17/2022  Patient's Name:  Tim Winn YOB: 1931            Patient Care Team:  MARIVEL Joy Chi, CNP as PCP - General (Nurse Practitioner)  MARIVEL Joy Chi, CNP as PCP - [facility]  Vicky Hopkins MD as Consulting Physician (Internal Medicine)  Shan Verdugo MD (Vascular Surgery)  Ariella Delgado MD as Surgeon (Cardiology)  Travis Grissom DPM as Physician (Podiatry)      Chief Complaint   Patient presents with    Wound Check     Right leg/ foot       Noemi Jain XAVIER Sg  AGE: 80 y.o. GENDER: female  : 1931  TODAY'S DATE:  3/17/2022    Subjective:       Tim Winn is a 80 y.o. female presents to the office today for follow up of an ulceration. Denies F/C/N/V. Wound Type:venous, pressure and non-healing/non-surgical  Wound Location:right leg posterior lateral   Modifying factors:venous stasis, lymphedema and diabetes            No results found for: LABA1C    ALLERGIES    Allergies   Allergen Reactions    Neosporin [Neomycin-Polymyxin-Gramicidin] Rash    Bactrim      GI upset, headache    Ciprofloxacin      GI upset , headache    Codeine     Erythromycin     Nalbuphine Nausea Only    Nubain [Nalbuphine Hcl]        Medications:    Current Outpatient Medications:     cefdinir (OMNICEF) 300 MG capsule, take 1 capsule by mouth twice a day for 7 days, Disp: , Rfl:     clotrimazole-betamethasone (LOTRISONE) 1-0.05 % cream, Apply topically 2 times daily. , Disp: 45 g, Rfl: 1    nitrofurantoin (MACRODANTIN) 100 MG capsule, , Disp: , Rfl:     Multiple Vitamins-Minerals (MULTIVITAMIN ADULT PO), Take by mouth, Disp: , Rfl:     Multiple Vitamins-Minerals (VITAMIN D3 COMPLETE PO), Take by mouth, Disp: , Rfl:    meclizine (ANTIVERT) 12.5 MG tablet, Take 1 tablet by mouth 2 times daily as needed for Dizziness, Disp: 30 tablet, Rfl: 1    polyethylene glycol (MIRALAX) powder, Take 17 g by mouth daily as needed (constipation), Disp: 250 g, Rfl: 6    Esomeprazole Magnesium (NEXIUM 24HR) 20 MG TBEC, Take 20 mg by mouth daily, Disp: 90 tablet, Rfl: 3    diclofenac (PENNSAID) 2 % SOLN, Place 2 g onto the skin 2 times daily, Disp: 112 g, Rfl: 0    apixaban (ELIQUIS) 5 MG TABS tablet, Take 5 mg by mouth, Disp: , Rfl:     nystatin (MYCOSTATIN) 583200 UNIT/GM cream, Apply topically 2 times daily. , Disp: 1 Tube, Rfl: 3    lisinopril (PRINIVIL;ZESTRIL) 5 MG tablet, Take 5 mg by mouth daily. , Disp: , Rfl:     propranolol (INDERAL) 20 MG tablet, TAKE 1 TABLET THREE TIMES A DAY (Patient taking differently: 40 mg 2 times daily ), Disp: 270 tablet, Rfl: 0    Review of Systems  Review of Systems - History obtained from chart review and the patient  General ROS: negative for - chills, fatigue, fever, night sweats or weight gain  Constitutional: Negative for chills, diaphoresis, fatigue, fever and unexpected weight change. Musculoskeletal: Positive for arthralgias, gait problem and joint swelling. Neurological ROS: negative for - behavioral changes, confusion, headaches or seizures. Negative for weakness and numbness. Dermatological ROS: negative for - mole changes, rash  Cardiovascular: Negative for leg swelling. Gastrointestinal: Negative for constipation, diarrhea, nausea and vomiting. Objective:       Physical Exam:  Ht 5' 8\" (1.727 m)   Wt 198 lb (89.8 kg)   BMI 30.11 kg/m²     NV status remains unchanged since last visit. Wound #:   1  Dermatologic Exam:  Skin lesion/ulceration Absent . Skin No rashes or nodules noted. .   Skin is thin, with flaky sloughing skin as well as decreased hair growth to the lower leg  Small red hemosiderin deposits seen dorsal foot   Musculoskeletal:     1st MPJ ROM decreased,

## 2022-03-17 NOTE — PATIENT INSTRUCTIONS
Schedule a Vaccine  When you qualify to receive the vaccine, call the [facility]) COVID-19 Vaccination Hotline to schedule your appointment or to get additional information about the [facility]) locations which are offering the COVID-19 vaccine. To be 94% effective, it's important that you receive two doses of one of the COVID-19 vaccines. -If you are receiving the [employer] vaccine, your second shot will be scheduled as close to 21 days after the first shot as possible. -If you are receiving the Moderna vaccine, your second shot will be scheduled as close to 28 days after the first shot as possible. [facility]) COVID-19 Vaccination Hotline: [phone]    Links to [facility]) website and [facility] website:    [email or web address]    [email or web address]

## 2022-04-21 ENCOUNTER — OFFICE VISIT (OUTPATIENT)
Dept: PODIATRY | Age: 87
End: 2022-04-21
Payer: MEDICARE

## 2022-04-21 VITALS — HEIGHT: 68 IN | WEIGHT: 198 LBS | BODY MASS INDEX: 30.01 KG/M2

## 2022-04-21 DIAGNOSIS — I89.0 LYMPHATIC EDEMA: ICD-10-CM

## 2022-04-21 DIAGNOSIS — L85.3 XEROSIS OF SKIN: Primary | ICD-10-CM

## 2022-04-21 PROCEDURE — 1036F TOBACCO NON-USER: CPT | Performed by: PODIATRIST

## 2022-04-21 PROCEDURE — 99213 OFFICE O/P EST LOW 20 MIN: CPT | Performed by: PODIATRIST

## 2022-04-21 PROCEDURE — 1090F PRES/ABSN URINE INCON ASSESS: CPT | Performed by: PODIATRIST

## 2022-04-21 PROCEDURE — G8427 DOCREV CUR MEDS BY ELIG CLIN: HCPCS | Performed by: PODIATRIST

## 2022-04-21 PROCEDURE — 1123F ACP DISCUSS/DSCN MKR DOCD: CPT | Performed by: PODIATRIST

## 2022-04-21 PROCEDURE — 4040F PNEUMOC VAC/ADMIN/RCVD: CPT | Performed by: PODIATRIST

## 2022-04-21 PROCEDURE — G8417 CALC BMI ABV UP PARAM F/U: HCPCS | Performed by: PODIATRIST

## 2022-04-21 NOTE — PATIENT INSTRUCTIONS
Schedule a Vaccine  When you qualify to receive the vaccine, call the [facility]) COVID-19 Vaccination Hotline to schedule your appointment or to get additional information about the [facility]) locations which are offering the COVID-19 vaccine. To be 94% effective, it's important that you receive two doses of one of the COVID-19 vaccines. -If you are receiving the Arevalo Peter vaccine, your second shot will be scheduled as close to 21 days after the first shot as possible. -If you are receiving the Moderna vaccine, your second shot will be scheduled as close to 28 days after the first shot as possible. [facility]) COVID-19 Vaccination Hotline: [phone]    Links to [facility]) website and [facility] website:    [email or web address]    [email or web address]

## 2022-04-21 NOTE — PROGRESS NOTES
[address] [facility] PODIATRY [facility]  [address] [place] [id number]  Dept: [phone]  Dept Fax: [phone]    RETURN WOUND CARE PROGRESS NOTE  Date of patient's visit: 2022  Patient's Name:  Noble Paz YOB: 1931            Patient Care Team:  MARIVEL Mcdermott CNP as PCP - General (Nurse Practitioner)  MARIVEL Mcdermott CNP as PCP - [facility]  Aminah Kirby MD as Consulting Physician (Internal Medicine)  Caridad Mason MD (Vascular Surgery)  Cathy Aguirre MD as Surgeon (Cardiology)  Zoey Liriano DPM as Physician (Podiatry)      Chief Complaint   Patient presents with    Wound Check     rtc 5 weeks - right leg        Thor Bettencourt  AGE: 80 y.o. GENDER: female  : 1931  TODAY'S DATE:  2022    Subjective:       Noble Paz is a 80 y.o. female presents to the office today for follow up of an ulceration. Denies F/C/N/V. Wound Type:venous  Wound Location:right leg  Modifying factors:venous stasis, diabetes, poor glucose control and chronic pressure             No results found for: LABA1C    ALLERGIES    Allergies   Allergen Reactions    Neosporin [Neomycin-Polymyxin-Gramicidin] Rash    Bactrim      GI upset, headache    Ciprofloxacin      GI upset , headache    Codeine     Erythromycin     Nalbuphine Nausea Only    Nubain [Nalbuphine Hcl]        Medications:    Current Outpatient Medications:     cefdinir (OMNICEF) 300 MG capsule, take 1 capsule by mouth twice a day for 7 days, Disp: , Rfl:     clotrimazole-betamethasone (LOTRISONE) 1-0.05 % cream, Apply topically 2 times daily. , Disp: 45 g, Rfl: 1    nitrofurantoin (MACRODANTIN) 100 MG capsule, , Disp: , Rfl:     Multiple Vitamins-Minerals (MULTIVITAMIN ADULT PO), Take by mouth, Disp: , Rfl:     Multiple Vitamins-Minerals (VITAMIN D3 COMPLETE PO), Take by mouth, Disp: , Rfl:     meclizine (ANTIVERT) 12.5 MG tablet, Take 1 tablet by mouth 2 times daily as needed for Dizziness, Disp: 30 tablet, Rfl: 1    polyethylene glycol (MIRALAX) powder, Take 17 g by mouth daily as needed (constipation), Disp: 250 g, Rfl: 6    Esomeprazole Magnesium (NEXIUM 24HR) 20 MG TBEC, Take 20 mg by mouth daily, Disp: 90 tablet, Rfl: 3    diclofenac (PENNSAID) 2 % SOLN, Place 2 g onto the skin 2 times daily, Disp: 112 g, Rfl: 0    apixaban (ELIQUIS) 5 MG TABS tablet, Take 5 mg by mouth, Disp: , Rfl:     nystatin (MYCOSTATIN) 490059 UNIT/GM cream, Apply topically 2 times daily. , Disp: 1 Tube, Rfl: 3    lisinopril (PRINIVIL;ZESTRIL) 5 MG tablet, Take 5 mg by mouth daily. , Disp: , Rfl:     propranolol (INDERAL) 20 MG tablet, TAKE 1 TABLET THREE TIMES A DAY (Patient taking differently: 40 mg 2 times daily ), Disp: 270 tablet, Rfl: 0    Review of Systems  Review of Systems - History obtained from chart review and the patient  General ROS: negative for - chills, fatigue, fever, night sweats or weight gain  Constitutional: Negative for chills, diaphoresis, fatigue, fever and unexpected weight change. Musculoskeletal: Positive for arthralgias, gait problem and joint swelling. Neurological ROS: negative for - behavioral changes, confusion, headaches or seizures. Negative for weakness and numbness. Dermatological ROS: negative for - mole changes, rash  Cardiovascular: Negative for leg swelling. Gastrointestinal: Negative for constipation, diarrhea, nausea and vomiting. Objective:       Physical Exam:  Ht 5' 8\" (1.727 m)   Wt 198 lb (89.8 kg)   BMI 30.11 kg/m²     NV status remains unchanged since last visit. Wound #:   1       Healed with slight masceration to the periwound area       Dermatologic Exam:  Skin lesion/ulceration Absent . Skin No rashes or nodules noted. .       Musculoskeletal:     1st MPJ ROM decreased, Bilateral.  Muscle strength 5/5, Bilateral.  Pain present upon palpation of right leg with edema to the leg . Medial longitudinal arch, Bilateral WNL. Ankle ROM WNL,Bilateral.    Dorsally contracted digits absent digits 1-5 Bilateral.     Vascular: Pitting 2+ edema noted to the right and left foot to the level of the midleg. DP and PT pulses palpable 2/4, Bilateral.  CFT <3 seconds, Bilateral.  Hair growth present to the level of the digits, Bilateral.  Edema absent, Bilateral.  Varicosities absent, Bilateral. Erythema absent, Bilateral    Neurological: Sensation intact to light touch to level of digits, Bilateral.  Protective sensation intact 10/10 sites via 5.07/10g [place] Monofilament, Bilateral.  negative Tinel's, Bilateral.  negative Valleix sign, Bilateral.      Integument: Warm, dry, supple, Bilateral.  Open lesion absent, Bilateral.  Interdigital maceration absent to web spaces 1-4, Bilateral.  Nails are normal in length, thickness and color 1-5 bilateral.  Fissures absent, Bilateral.       Assessment:     Assessment: Patient is a 80 y.o. female with:   Diagnosis Orders   1. Xerosis of skin     2. Lymphatic edema           Overall Wound Assessment  Wound is healed. Size has decreased  Appearance has improved      Plan:     Pt examined and evaluated. Current condition and treatment options discussed in detail. Nishant Matute Age:  80 y.o. Gender:  female   :  1931  Today's Date:  2022      Procedure:      Pt to continue with ace wraps and compression. Pt to use the hydrocortisone cream to the area daily   Discussed appropriate home care of this wound. Wound redressed. Patient instructions were given. Dispensed dressing supplies and instructions on their use. Dressings: No orders of the defined types were placed in this encounter. No orders of the defined types were placed in this encounter. All labs were reviewed and all imagining including the above findings were reviewed PRIOR to the patients arrival and with the patient today.   Previous patient encounter was reviewed. Encounters from the patients other medical providers were reviewed and noted. Time was spent educating the patient and their families/caregivers on proper care of the feet and ankles. All the above diagnosis were addressed at todays visit and all questions were answered. A total of 25 minutes was spent with this patients encounter which included charting after the patients visit    Follow up: 4 week.       Electronically signed by Leonel Llanos DPM on 4/21/2022 at 1:22 PM  4/21/2022

## 2022-06-02 ENCOUNTER — OFFICE VISIT (OUTPATIENT)
Dept: PODIATRY | Age: 87
End: 2022-06-02
Payer: MEDICARE

## 2022-06-02 VITALS — HEIGHT: 68 IN | WEIGHT: 198 LBS | BODY MASS INDEX: 30.01 KG/M2

## 2022-06-02 DIAGNOSIS — B35.3 TINEA PEDIS OF RIGHT FOOT: ICD-10-CM

## 2022-06-02 DIAGNOSIS — L85.3 XEROSIS OF SKIN: Primary | ICD-10-CM

## 2022-06-02 PROCEDURE — 1123F ACP DISCUSS/DSCN MKR DOCD: CPT | Performed by: PODIATRIST

## 2022-06-02 PROCEDURE — 1090F PRES/ABSN URINE INCON ASSESS: CPT | Performed by: PODIATRIST

## 2022-06-02 PROCEDURE — 1036F TOBACCO NON-USER: CPT | Performed by: PODIATRIST

## 2022-06-02 PROCEDURE — G8427 DOCREV CUR MEDS BY ELIG CLIN: HCPCS | Performed by: PODIATRIST

## 2022-06-02 PROCEDURE — G8417 CALC BMI ABV UP PARAM F/U: HCPCS | Performed by: PODIATRIST

## 2022-06-02 PROCEDURE — 99213 OFFICE O/P EST LOW 20 MIN: CPT | Performed by: PODIATRIST

## 2022-06-02 NOTE — PROGRESS NOTES
[address] [facility] PODIATRY [facility]  [address] [place] [phone]  Dept: [phone]  Dept Fax: [phone]    RETURN WOUND CARE PROGRESS NOTE  Date of patient's visit: 2022  Patient's Name:  Romero Glasgow YOB: 1931            Patient Care Team:  MARIVEL Cochran CNP as PCP - General (Nurse Practitioner)  MARIVEL Cochran CNP as PCP - [address] [place] [address] [facility]  Renee Alonso MD as Consulting Physician (Internal Medicine)  Toño Celaya MD (Vascular Surgery)  Vicky Akins MD as Surgeon (Cardiology)  Andrew Blank DPM as Physician (Podiatry)      Chief Complaint   Patient presents with    Wound Check     RLE       Romero Glasgow  AGE: 80 y.o. GENDER: female  : 1931  TODAY'S DATE:  2022  Pt's primary care physician is MARIVEL Cochran CNP last seen 2022  Subjective:       Romero Glasgow is a 80 y.o. female presents to the office today for follow up of an ulceration. Denies F/C/N/V. Wound Type:pressure  Wound Location:right leg  Modifying factors:lymphedema            No results found for: LABA1C    ALLERGIES    Allergies   Allergen Reactions    Neosporin [Neomycin-Polymyxin-Gramicidin] Rash    Bactrim      GI upset, headache    Ciprofloxacin      GI upset , headache    Codeine     Erythromycin     Nalbuphine Nausea Only    Nubain [Nalbuphine Hcl]        Medications:    Current Outpatient Medications:     cefdinir (OMNICEF) 300 MG capsule, take 1 capsule by mouth twice a day for 7 days, Disp: , Rfl:     clotrimazole-betamethasone (LOTRISONE) 1-0.05 % cream, Apply topically 2 times daily. , Disp: 45 g, Rfl: 1    nitrofurantoin (MACRODANTIN) 100 MG capsule, , Disp: , Rfl:     Multiple Vitamins-Minerals (MULTIVITAMIN ADULT PO), Take by mouth, Disp: , Rfl:     Multiple Vitamins-Minerals (VITAMIN D3 COMPLETE PO), Take by mouth, Disp: , Rfl:     meclizine (ANTIVERT) 12.5 MG tablet, Take 1 tablet by mouth 2 times daily as needed for Dizziness, Disp: 30 tablet, Rfl: 1    polyethylene glycol (MIRALAX) powder, Take 17 g by mouth daily as needed (constipation), Disp: 250 g, Rfl: 6    Esomeprazole Magnesium (NEXIUM 24HR) 20 MG TBEC, Take 20 mg by mouth daily, Disp: 90 tablet, Rfl: 3    diclofenac (PENNSAID) 2 % SOLN, Place 2 g onto the skin 2 times daily, Disp: 112 g, Rfl: 0    apixaban (ELIQUIS) 5 MG TABS tablet, Take 5 mg by mouth, Disp: , Rfl:     nystatin (MYCOSTATIN) 559247 UNIT/GM cream, Apply topically 2 times daily. , Disp: 1 Tube, Rfl: 3    lisinopril (PRINIVIL;ZESTRIL) 5 MG tablet, Take 5 mg by mouth daily. , Disp: , Rfl:     propranolol (INDERAL) 20 MG tablet, TAKE 1 TABLET THREE TIMES A DAY (Patient taking differently: 40 mg 2 times daily ), Disp: 270 tablet, Rfl: 0    Review of Systems  Review of Systems - History obtained from chart review and the patient  General ROS: negative for - chills, fatigue, fever, night sweats or weight gain  Constitutional: Negative for chills, diaphoresis, fatigue, fever and unexpected weight change. Musculoskeletal: Positive for arthralgias, gait problem and joint swelling. Neurological ROS: negative for - behavioral changes, confusion, headaches or seizures. Negative for weakness and numbness. Dermatological ROS: negative for - mole changes, rash  Cardiovascular: Negative for leg swelling. Gastrointestinal: Negative for constipation, diarrhea, nausea and vomiting. Objective:       Physical Exam:  Ht 5' 8\" (1.727 m)   Wt 198 lb (89.8 kg)   BMI 30.11 kg/m²     NV status remains unchanged since last visit. Wound #:   1    pressure ulcer: Stage III   right leg    Wound measurements:  #1 healed     Wound Depth: partial thickness.      Drainage:    none Type: none    Wound Bed status:   Granulation Tissue: 0%   Necrotic 0%  Type:   Epithelialization 90%   Hypergranular 0%   Periwound hyperkeratosis:  absent  Erythema absent  Odor:no  Maceration:no  Undermining/tract/tunneling:no  Culture taken:   no             Assessment:     Assessment: Patient is a 80 y.o. female with:   Diagnosis Orders   1. Xerosis of skin  REX Bowden MD, Dermatology, [place]   2. Tinea pedis of right foot  REX Bowden MD, Dermatology, [place]       Overall Wound Assessment  Wound is has improved. Size has decreased  Appearance has significantly improved      Plan:     Pt examined and evaluated. Current condition and treatment options discussed in detail. Latasha Lo Age:  80 y.o. Gender:  female   :  1931  Today's Date:  2022      Procedure:    No debridement today. Will refer to derm of possible biopsy       Pt to contine with compression. For the leg swelling  Rx given for compression stockings to be worn during the day. Pt to elevate at night above the heart   ressings: No orders of the defined types were placed in this encounter. No orders of the defined types were placed in this encounter. Follow up: 4w   week.       Electronically signed by Joseph Michael DPM on 2022 at 1:05 PM  2022

## 2022-06-02 NOTE — PATIENT INSTRUCTIONS
Schedule a Vaccine  When you qualify to receive the vaccine, call the [facility]) COVID-19 Vaccination Hotline to schedule your appointment or to get additional information about the [facility]) locations which are offering the COVID-19 vaccine. To be 94% effective, it's important that you receive two doses of one of the COVID-19 vaccines. -If you are receiving the Arevalo Peter vaccine, your second shot will be scheduled as close to 21 days after the first shot as possible. -If you are receiving the Moderna vaccine, your second shot will be scheduled as close to 28 days after the first shot as possible. [facility]) COVID-19 Vaccination Hotline: [phone]    Links to [facility]) website and [facility] website:    RahMundi/[facility]-monitoring-[email or web address]-covid-[email or web address]-[email or web address]-[email or web address]    [email or web address]

## 2022-07-07 ENCOUNTER — OFFICE VISIT (OUTPATIENT)
Dept: PODIATRY | Age: 87
End: 2022-07-07
Payer: MEDICARE

## 2022-07-07 VITALS — BODY MASS INDEX: 30.01 KG/M2 | WEIGHT: 198 LBS | HEIGHT: 68 IN

## 2022-07-07 DIAGNOSIS — M79.671 PAIN IN BOTH FEET: ICD-10-CM

## 2022-07-07 DIAGNOSIS — L85.3 XEROSIS OF SKIN: ICD-10-CM

## 2022-07-07 DIAGNOSIS — B35.1 DERMATOPHYTOSIS OF NAIL: Primary | ICD-10-CM

## 2022-07-07 DIAGNOSIS — I89.0 LYMPHATIC EDEMA: ICD-10-CM

## 2022-07-07 DIAGNOSIS — I73.9 PERIPHERAL VASCULAR DISORDER (HCC): ICD-10-CM

## 2022-07-07 DIAGNOSIS — M79.672 PAIN IN BOTH FEET: ICD-10-CM

## 2022-07-07 PROCEDURE — 99999 PR OFFICE/OUTPT VISIT,PROCEDURE ONLY: CPT | Performed by: PODIATRIST

## 2022-07-07 PROCEDURE — 11721 DEBRIDE NAIL 6 OR MORE: CPT | Performed by: PODIATRIST

## 2022-07-07 NOTE — PROGRESS NOTES
[address] [facility] PODIATRY [facility]  [address] [address] [address]  Dept: [phone]  Dept Fax: [phone]     PAIN PROGRESS NOTE  Date of patient's visit: 7/7/2022  Patient's Name:  Lilia Jarvis YOB: 1931            Patient Care Team:  MARIVEL Cummings CNP as PCP - General (Nurse Practitioner)  MARIVEL Cummings CNP as PCP - REHABILITATION [facility]  Doraine Seip, MD as Consulting Physician (Internal Medicine)  Jacy South MD (Vascular Surgery)  Franchesca Lagunas MD as Surgeon (Cardiology)  Rosy Salas DPM as Physician (Podiatry)      Chief Complaint   Patient presents with    Nail Problem     toe nail trim    Foot Pain     bilateral foot care       Subjective: This Lilia Jarvis comes to clinic for foot and nail care. Pt currently has complaint of thickened, painful, elongated nails that he/she cannot manage by themselves. Pt. Relates pain to nails with shoe gear. Pt's primary care physician is MARIVEL Cummings CNP last seen 04/25/2022. Pt has a new complaint of noen. She is using compression wraps to help with the edema now . Past Medical History:   Diagnosis Date    Atrial fibrillation (HCC)     Cancer (HCC)     breast    GERD (gastroesophageal reflux disease)     Hypertension     Osteoarthritis     Sick sinus syndrome (HCC)        Allergies   Allergen Reactions    Neosporin [Neomycin-Polymyxin-Gramicidin] Rash    Bactrim      GI upset, headache    Ciprofloxacin      GI upset , headache    Codeine     Erythromycin     Nalbuphine Nausea Only    Nubain [Nalbuphine Hcl]      Current Outpatient Medications on File Prior to Visit   Medication Sig Dispense Refill    cefdinir (OMNICEF) 300 MG capsule take 1 capsule by mouth twice a day for 7 days      clotrimazole-betamethasone (LOTRISONE) 1-0.05 % cream Apply topically 2 times daily.  45 g 1    nitrofurantoin (MACRODANTIN) 100 MG capsule       Multiple Vitamins-Minerals (MULTIVITAMIN ADULT PO) Take by mouth      Multiple Vitamins-Minerals (VITAMIN D3 COMPLETE PO) Take by mouth      meclizine (ANTIVERT) 12.5 MG tablet Take 1 tablet by mouth 2 times daily as needed for Dizziness 30 tablet 1    polyethylene glycol (MIRALAX) powder Take 17 g by mouth daily as needed (constipation) 250 g 6    Esomeprazole Magnesium (NEXIUM 24HR) 20 MG TBEC Take 20 mg by mouth daily 90 tablet 3    diclofenac (PENNSAID) 2 % SOLN Place 2 g onto the skin 2 times daily 112 g 0    apixaban (ELIQUIS) 5 MG TABS tablet Take 5 mg by mouth      nystatin (MYCOSTATIN) 407808 UNIT/GM cream Apply topically 2 times daily. 1 Tube 3    lisinopril (PRINIVIL;ZESTRIL) 5 MG tablet Take 5 mg by mouth daily.  propranolol (INDERAL) 20 MG tablet TAKE 1 TABLET THREE TIMES A DAY (Patient taking differently: 40 mg 2 times daily ) 270 tablet 0     No current facility-administered medications on file prior to visit. Review of Systems. Review of Systems:   History obtained from chart review and the patient  General ROS: negative for - chills, fatigue, fever, night sweats or weight gain  Constitutional: Negative for chills, diaphoresis, fatigue, fever and unexpected weight change. Musculoskeletal: Positive for arthralgias, gait problem and joint swelling. Neurological ROS: negative for - behavioral changes, confusion, headaches or seizures. Negative for weakness and numbness. Dermatological ROS: negative for - mole changes, rash  Cardiovascular: Negative for leg swelling. Gastrointestinal: Negative for constipation, diarrhea, nausea and vomiting. Objective:  Dermatologic Exam:  Skin lesion/ulceration Absent . Skin No rashes or nodules noted. .   Skin is thin, with flaky sloughing skin as well as decreased hair growth to the lower leg  Small red hemosiderin deposits seen dorsal foot   Musculoskeletal:     1st MPJ ROM decreased, Bilateral.  Muscle strength 5/5, Bilateral.  Pain present upon palpation of toenails 1-5, Bilateral. decreased medial longitudinal arch, Bilateral.  Ankle ROM decreased,Bilateral.    Dorsally contracted digits present digits 2, Bilateral.     Vascular: DP pulses 1/4 bilateral.  PT pulses 0/4 bilateral.   CFT <5 seconds, Bilateral.  Hair growth absent to the level of the digits, Bilateral.  Edema present, Bilateral.  Varicosities absent, Bilateral. Erythema absent, Bilateral    Neurological: Sensation diminshed to light touch to level of digits, Bilateral.  Protective sensation intact 6/10 sites via 5.07/10g [place]-Codi Monofilament, Bilateral.  negative Tinel's, Bilateral.  negative Valleix sign, Bilateral.      Integument: Warm, dry, supple, Bilateral.  Open lesion absent, Bilateral.  Interdigital maceration absent to web spaces 4, Bilateral.  Nails 1-5 left and 1-5 right thickened > 3.0 mm, dystrophic and crumbly, discolored with subungual debris. Fissures absent, Bilateral.   General: AAO x 3 in NAD.     Derm  Toenail Description  Sites of Onychomycosis Involvement (Check affected area)  [x] [x] [x] [x] [x] [x] [x] [x] [x] [x]  5 4 3 2 1 1 2 3 4 5                          Right                                        Left    Thickness  [x] [x] [x] [x] [x] [x] [x] [x] [x] [x]  5 4 3 2 1 1 2 3 4 5                         Right                                        Left    Dystrophic Changes   [x] [x] [x] [x] [x] [x] [x] [x] [x] [x]  5 4 3 2 1 1 2 3 4 5                         Right                                        Left    Color  [x] [x] [x] [x] [x] [x] [x] [x] [x] [x]  5 4 3 2 1 1 2 3 4 5                          Right                                        Left    Incurvation/Ingrowin   [] [] [] [] [] [] [] [] [] []  5 4 3 2 1 1 2 3 4 5                         Right                                        Left    Inflammation/Pain   [x] [x] [x] [x] [x] [x] [x] [x] [x] [x]  5 4 3  2 1 1 2 3 4 5                         Right Left       Nails are painful 1-10 with direct palpation. Q7   []Yes  []No                Q8   [x]Yes  []No                     Q9   []Yes    []No  Assessment:  80 y.o. female with:    Diagnosis Orders   1. Dermatophytosis of nail  42486 - [place] DEBRIDEMENT OF NAILS, 6 OR MORE   2. Xerosis of skin  89551 - [place] DEBRIDEMENT OF NAILS, 6 OR MORE   3. Lymphatic edema  00040 - [place] DEBRIDEMENT OF NAILS, 6 OR MORE   4. Peripheral vascular disorder (HCC)  49674 - [place] DEBRIDEMENT OF NAILS, 6 OR MORE   5. Pain in both feet  84020 - [place] DEBRIDEMENT OF NAILS, 6 OR MORE         Plan:   Pt was evaluated and examined. Patient was given personalized discharge instructions. Nails 1-10 were debrided in length and thickness sharply with a nail nipper and  without incident. Pt will follow up in 9 weeks or sooner if any problems arise. Diagnosis was discussed with the pt and all of their questions were answered in detail. Proper foot hygiene and care was discussed with the pt. Patient to check feet daily and contact the office with any questions/problems/concerns. Other comorbidity noted and will be managed by PCP. Pain waiver discussed with patient and confirmed.    7/7/2022      Electronically signed by Zuleyma Dunne DPM on 7/7/2022 at 1:05 PM  7/7/2022

## 2022-09-08 ENCOUNTER — OFFICE VISIT (OUTPATIENT)
Dept: PODIATRY | Age: 87
End: 2022-09-08
Payer: MEDICARE

## 2022-09-08 VITALS — HEIGHT: 68 IN | WEIGHT: 198 LBS | BODY MASS INDEX: 30.01 KG/M2

## 2022-09-08 DIAGNOSIS — I83.018 VENOUS STASIS ULCER OF OTHER PART OF RIGHT LOWER LEG LIMITED TO BREAKDOWN OF SKIN WITH VARICOSE VEINS (HCC): ICD-10-CM

## 2022-09-08 DIAGNOSIS — I73.9 PERIPHERAL VASCULAR DISORDER (HCC): ICD-10-CM

## 2022-09-08 DIAGNOSIS — B35.1 DERMATOPHYTOSIS OF NAIL: Primary | ICD-10-CM

## 2022-09-08 DIAGNOSIS — M79.672 PAIN IN BOTH FEET: ICD-10-CM

## 2022-09-08 DIAGNOSIS — L85.3 XEROSIS OF SKIN: ICD-10-CM

## 2022-09-08 DIAGNOSIS — M79.671 PAIN IN BOTH FEET: ICD-10-CM

## 2022-09-08 DIAGNOSIS — B35.3 TINEA PEDIS OF RIGHT FOOT: ICD-10-CM

## 2022-09-08 DIAGNOSIS — L97.811 VENOUS STASIS ULCER OF OTHER PART OF RIGHT LOWER LEG LIMITED TO BREAKDOWN OF SKIN WITH VARICOSE VEINS (HCC): ICD-10-CM

## 2022-09-08 DIAGNOSIS — I89.0 LYMPHATIC EDEMA: ICD-10-CM

## 2022-09-08 PROCEDURE — 11721 DEBRIDE NAIL 6 OR MORE: CPT | Performed by: PODIATRIST

## 2022-09-08 PROCEDURE — 99999 PR OFFICE/OUTPT VISIT,PROCEDURE ONLY: CPT | Performed by: PODIATRIST

## 2022-09-08 NOTE — PROGRESS NOTES
[address] [facility] PODIATRY [facility]  [address] [address] [place] [address]  Dept: [phone]  Dept Fax: [phone]     PAIN PROGRESS NOTE  Date of patient's visit: 9/8/2022  Patient's Name:  Ethan Velazquez YOB: 1931            Patient Care Team:  MARIVEL Johansen CNP as PCP - General (Nurse Practitioner)  MARIVEL Johansen CNP as PCP - [facility]  Ashutosh Thornton MD as Consulting Physician (Internal Medicine)  Gilbert Bey MD (Vascular Surgery)  Vijay Anne MD as Surgeon (Cardiology)  Dylan Henry DPM as Physician (Podiatry)      Chief Complaint   Patient presents with    Foot Pain     Bilateral foot     Nail Problem     Toenail trim       Subjective: This Ethan Batch comes to clinic for foot and nail care. Pt currently has complaint of thickened, painful, elongated nails that he/she cannot manage by themselves. Pt. Relates pain to nails with shoe gear. Pt's primary care physician is MARIVEL Johansen CNP last seen 4/25/22. Pt has a new complaint of none today . Past Medical History:   Diagnosis Date    Atrial fibrillation (HCC)     Cancer ([facility] [address]     breast    GERD (gastroesophageal reflux disease)     Hypertension     Osteoarthritis     Sick sinus syndrome (HCC)        Allergies   Allergen Reactions    Neosporin [Neomycin-Polymyxin-Gramicidin] Rash    Bactrim      GI upset, headache    Ciprofloxacin      GI upset , headache    Codeine     Erythromycin     Nalbuphine Nausea Only    Nubain [Nalbuphine Hcl]      Current Outpatient Medications on File Prior to Visit   Medication Sig Dispense Refill    cefdinir (OMNICEF) 300 MG capsule take 1 capsule by mouth twice a day for 7 days      clotrimazole-betamethasone (LOTRISONE) 1-0.05 % cream Apply topically 2 times daily.  45 g 1    nitrofurantoin (MACRODANTIN) 100 MG capsule       Multiple Vitamins-Minerals (MULTIVITAMIN ADULT PO) Take by mouth      Multiple Vitamins-Minerals (VITAMIN D3 COMPLETE PO) Take by mouth      meclizine (ANTIVERT) 12.5 MG tablet Take 1 tablet by mouth 2 times daily as needed for Dizziness 30 tablet 1    polyethylene glycol (MIRALAX) powder Take 17 g by mouth daily as needed (constipation) 250 g 6    Esomeprazole Magnesium (NEXIUM 24HR) 20 MG TBEC Take 20 mg by mouth daily 90 tablet 3    diclofenac (PENNSAID) 2 % SOLN Place 2 g onto the skin 2 times daily 112 g 0    apixaban (ELIQUIS) 5 MG TABS tablet Take 5 mg by mouth      nystatin (MYCOSTATIN) 631737 UNIT/GM cream Apply topically 2 times daily. 1 Tube 3    lisinopril (PRINIVIL;ZESTRIL) 5 MG tablet Take 5 mg by mouth daily. propranolol (INDERAL) 20 MG tablet TAKE 1 TABLET THREE TIMES A DAY (Patient taking differently: 40 mg 2 times daily) 270 tablet 0     No current facility-administered medications on file prior to visit. Review of Systems. Review of Systems:   History obtained from chart review and the patient  General ROS: negative for - chills, fatigue, fever, night sweats or weight gain  Constitutional: Negative for chills, diaphoresis, fatigue, fever and unexpected weight change. Musculoskeletal: Positive for arthralgias, gait problem and joint swelling. Neurological ROS: negative for - behavioral changes, confusion, headaches or seizures. Negative for weakness and numbness. Dermatological ROS: negative for - mole changes, rash  Cardiovascular: Negative for leg swelling. Gastrointestinal: Negative for constipation, diarrhea, nausea and vomiting. Objective:  Dermatologic Exam:  Skin lesion/ulceration Absent . Skin No rashes or nodules noted. .   Skin is thin, with flaky sloughing skin as well as decreased hair growth to the lower leg  Small red hemosiderin deposits seen dorsal foot   Musculoskeletal:     1st MPJ ROM decreased, Bilateral.  Muscle strength 5/5, Bilateral.  Pain present upon palpation of toenails 1-5, Bilateral. decreased medial longitudinal arch, Bilateral.  Ankle ROM decreased,Bilateral.    Dorsally contracted digits present digits 2, Bilateral.     Vascular: DP pulses 1/4 bilateral.  PT pulses 0/4 bilateral.   CFT <5 seconds, Bilateral.  Hair growth absent to the level of the digits, Bilateral.  Edema present, Bilateral.  Varicosities absent, Bilateral. Erythema absent, Bilateral    Neurological: Sensation diminshed to light touch to level of digits, Bilateral.  Protective sensation intact 6/10 sites via 5.07/10g [place]-Codi Monofilament, Bilateral.  negative Tinel's, Bilateral.  negative Valleix sign, Bilateral.      Integument: Warm, dry, supple, Bilateral.  Open lesion absent, Bilateral.  Interdigital maceration absent to web spaces 4, Bilateral.  Nails 1-5 left and 1-5 right thickened > 3.0 mm, dystrophic and crumbly, discolored with subungual debris. Fissures absent, Bilateral.   General: AAO x 3 in NAD.     Derm  Toenail Description  Sites of Onychomycosis Involvement (Check affected area)  [x] [x] [x] [x] [x] [x] [x] [x] [x] [x]  5 4 3 2 1 1 2 3 4 5                          Right                                        Left    Thickness  [x] [x] [x] [x] [x] [x] [x] [x] [x] [x]  5 4 3 2 1 1 2 3 4 5                         Right                                        Left    Dystrophic Changes   [x] [x] [x] [x] [x] [x] [x] [x] [x] [x]  5 4 3 2 1 1 2 3 4 5                         Right                                        Left    Color  [x] [x] [x] [x] [x] [x] [x] [x] [x] [x]  5 4 3 2 1 1 2 3 4 5                          Right                                        Left    Incurvation/Ingrowin   [] [] [] [] [] [] [] [] [] []  5 4 3 2 1 1 2 3 4 5                         Right                                        Left    Inflammation/Pain   [x] [x] [x] [x] [x] [x] [x] [x] [x] [x]  5 4 3  2 1 1 2 3 4 5                         Right                                        Left       Nails are painful 1-10 with direct palpation. Q7   []Yes  []No                Q8   [x]Yes  []No                     Q9   []Yes    []No  Assessment:  80 y.o. female with:    Diagnosis Orders   1. Dermatophytosis of nail        2. Xerosis of skin        3. Lymphatic edema        4. Peripheral vascular disorder (HCC)        5. Pain in both feet        6. Tinea pedis of right foot        7. Venous stasis ulcer of other part of right lower leg limited to breakdown of skin with varicose veins (HCC)              Plan:   Pt was evaluated and examined. Patient was given personalized discharge instructions. Nails 1-10 were debrided in length and thickness sharply with a nail nipper and  without incident. Pt will follow up in 9 weeks or sooner if any problems arise. Diagnosis was discussed with the pt and all of their questions were answered in detail. Proper foot hygiene and care was discussed with the pt. Patient to check feet daily and contact the office with any questions/problems/concerns. Other comorbidity noted and will be managed by PCP. Pain waiver discussed with patient and confirmed.    9/8/2022      Electronically signed by David Duran DPM on 9/8/2022 at 1:12 PM  9/8/2022

## 2022-09-08 NOTE — PATIENT INSTRUCTIONS
Schedule a Vaccine  When you qualify to receive the vaccine, call the [facility]) COVID-19 Vaccination Hotline to schedule your appointment or to get additional information about the [facility]) locations which are offering the COVID-19 vaccine. To be 94% effective, it's important that you receive two doses of one of the COVID-19 vaccines. -If you are receiving the Arevalo Peter vaccine, your second shot will be scheduled as close to 21 days after the first shot as possible. -If you are receiving the Moderna vaccine, your second shot will be scheduled as close to 28 days after the first shot as possible. [facility]) COVID-19 Vaccination Hotline: [phone]    Links to [facility]) website and [facility] website:    [email or web address]    [email or web address]

## 2022-10-11 ENCOUNTER — HOSPITAL ENCOUNTER (OUTPATIENT)
Age: 87
Setting detail: SPECIMEN
Discharge: HOME | End: 2022-10-11

## 2022-10-11 ENCOUNTER — OFFICE VISIT (OUTPATIENT)
Dept: FAMILY MEDICINE CLINIC | Age: 87
End: 2022-10-11
Payer: MEDICARE

## 2022-10-11 VITALS
OXYGEN SATURATION: 99 % | HEART RATE: 70 BPM | TEMPERATURE: 98.3 F | DIASTOLIC BLOOD PRESSURE: 60 MMHG | SYSTOLIC BLOOD PRESSURE: 158 MMHG

## 2022-10-11 DIAGNOSIS — L98.9 SKIN LESIONS: ICD-10-CM

## 2022-10-11 DIAGNOSIS — D64.9 ANEMIA, UNSPECIFIED TYPE: ICD-10-CM

## 2022-10-11 DIAGNOSIS — I89.0 LYMPHEDEMA: ICD-10-CM

## 2022-10-11 DIAGNOSIS — C50.912 MALIGNANT NEOPLASM OF LEFT FEMALE BREAST, UNSPECIFIED ESTROGEN RECEPTOR STATUS, UNSPECIFIED SITE OF BREAST (HCC): ICD-10-CM

## 2022-10-11 DIAGNOSIS — Z23 NEED FOR INFLUENZA VACCINATION: Primary | ICD-10-CM

## 2022-10-11 DIAGNOSIS — Z13.29 SCREENING FOR THYROID DISORDER: ICD-10-CM

## 2022-10-11 DIAGNOSIS — I47.1 PAROXYSMAL SUPRAVENTRICULAR TACHYCARDIA (HCC): ICD-10-CM

## 2022-10-11 DIAGNOSIS — N39.0 RECURRENT UTI: ICD-10-CM

## 2022-10-11 LAB
ABSOLUTE EOS #: 0.46 K/UL (ref 0–0.44)
ABSOLUTE IMMATURE GRANULOCYTE: 0.03 K/UL (ref 0–0.3)
ABSOLUTE LYMPH #: 2.61 K/UL (ref 1.1–3.7)
ABSOLUTE MONO #: 1.13 K/UL (ref 0.1–1.2)
BASOPHILS # BLD: 1 % (ref 0–2)
BASOPHILS ABSOLUTE: 0.05 K/UL (ref 0–0.2)
EOSINOPHILS RELATIVE PERCENT: 6 % (ref 1–4)
HCT VFR BLD CALC: 34.9 % (ref 36.3–47.1)
HEMOGLOBIN: 11.4 G/DL (ref 11.9–15.1)
IMMATURE GRANULOCYTES: 0 %
LYMPHOCYTES # BLD: 33 % (ref 24–43)
MCH RBC QN AUTO: 33.1 PG (ref 25.2–33.5)
MCHC RBC AUTO-ENTMCNC: 32.7 G/DL (ref 28.4–34.8)
MCV RBC AUTO: 101.5 FL (ref 82.6–102.9)
MONOCYTES # BLD: 14 % (ref 3–12)
NRBC AUTOMATED: 0 PER 100 WBC
PDW BLD-RTO: 13.2 % (ref 11.8–14.4)
PLATELET # BLD: 239 K/UL (ref 138–453)
PMV BLD AUTO: 10.9 FL (ref 8.1–13.5)
RBC # BLD: 3.44 M/UL (ref 3.95–5.11)
SEG NEUTROPHILS: 46 % (ref 36–65)
SEGMENTED NEUTROPHILS ABSOLUTE COUNT: 3.65 K/UL (ref 1.5–8.1)
WBC # BLD: 7.9 K/UL (ref 3.5–11.3)

## 2022-10-11 PROCEDURE — G8427 DOCREV CUR MEDS BY ELIG CLIN: HCPCS | Performed by: INTERNAL MEDICINE

## 2022-10-11 PROCEDURE — 90694 VACC AIIV4 NO PRSRV 0.5ML IM: CPT | Performed by: INTERNAL MEDICINE

## 2022-10-11 PROCEDURE — 1090F PRES/ABSN URINE INCON ASSESS: CPT | Performed by: INTERNAL MEDICINE

## 2022-10-11 PROCEDURE — 1036F TOBACCO NON-USER: CPT | Performed by: INTERNAL MEDICINE

## 2022-10-11 PROCEDURE — 99203 OFFICE O/P NEW LOW 30 MIN: CPT | Performed by: INTERNAL MEDICINE

## 2022-10-11 PROCEDURE — G0008 ADMIN INFLUENZA VIRUS VAC: HCPCS | Performed by: INTERNAL MEDICINE

## 2022-10-11 PROCEDURE — G8417 CALC BMI ABV UP PARAM F/U: HCPCS | Performed by: INTERNAL MEDICINE

## 2022-10-11 PROCEDURE — G8484 FLU IMMUNIZE NO ADMIN: HCPCS | Performed by: INTERNAL MEDICINE

## 2022-10-11 PROCEDURE — 1123F ACP DISCUSS/DSCN MKR DOCD: CPT | Performed by: INTERNAL MEDICINE

## 2022-10-11 SDOH — ECONOMIC STABILITY: FOOD INSECURITY: WITHIN THE PAST 12 MONTHS, YOU WORRIED THAT YOUR FOOD WOULD RUN OUT BEFORE YOU GOT MONEY TO BUY MORE.: NEVER TRUE

## 2022-10-11 SDOH — ECONOMIC STABILITY: FOOD INSECURITY: WITHIN THE PAST 12 MONTHS, THE FOOD YOU BOUGHT JUST DIDN'T LAST AND YOU DIDN'T HAVE MONEY TO GET MORE.: NEVER TRUE

## 2022-10-11 ASSESSMENT — PATIENT HEALTH QUESTIONNAIRE - PHQ9
SUM OF ALL RESPONSES TO PHQ QUESTIONS 1-9: 1
SUM OF ALL RESPONSES TO PHQ QUESTIONS 1-9: 1
SUM OF ALL RESPONSES TO PHQ9 QUESTIONS 1 & 2: 1
1. LITTLE INTEREST OR PLEASURE IN DOING THINGS: 0
2. FEELING DOWN, DEPRESSED OR HOPELESS: 1
SUM OF ALL RESPONSES TO PHQ QUESTIONS 1-9: 1
SUM OF ALL RESPONSES TO PHQ QUESTIONS 1-9: 1

## 2022-10-11 ASSESSMENT — SOCIAL DETERMINANTS OF HEALTH (SDOH): HOW HARD IS IT FOR YOU TO PAY FOR THE VERY BASICS LIKE FOOD, HOUSING, MEDICAL CARE, AND HEATING?: NOT VERY HARD

## 2022-10-11 NOTE — PROGRESS NOTES
[facility]  [facility] Teresa Neighbor [address] [place] [id number]  Dept: [phone]  Dept Fax: [phone]      Herman Nolan is a 80 y.o. female who presents today for hermedical conditions/complaints as noted below. Herman Nolan is c/o of Establish Care        Assessment/Plan:     1. Need for influenza vaccination          No follow-ups on file. HPI         Has lymphedema - using thigh high pumps three times a week, using wraps after done with the pumps and keeps it on into the following day, one day a week when she is only wearing compression stockings. BP Readings from Last 3 Encounters:   10/11/22 (!) 158/60   10/09/20 138/78   10/15/19 132/64              Past Medical History:   Diagnosis Date    Atrial fibrillation (HCC)     Cancer (HCC)     breast    GERD (gastroesophageal reflux disease)     Hypertension     Osteoarthritis     Sick sinus syndrome ([address]       Past Surgical History:   Procedure Laterality Date    APPENDECTOMY      BREAST SURGERY      lumpectomy right breast w/ axillary lymph node dissection    CATARACT REMOVAL      HYSTERECTOMY (CERVIX STATUS UNKNOWN)      HYSTERECTOMY, TOTAL ABDOMINAL (CERVIX REMOVED)      PACEMAKER PLACEMENT      SALPINGO-OOPHORECTOMY      bilateral       No family history on file. Social History     Tobacco Use    Smoking status: Never    Smokeless tobacco: Never   Substance Use Topics    Alcohol use: No        Allergies   Allergen Reactions    Neosporin [Neomycin-Polymyxin-Gramicidin] Rash    Bactrim      GI upset, headache    Ciprofloxacin      GI upset , headache    Codeine     Erythromycin     Nalbuphine Nausea Only    Nubain [Nalbuphine Hcl]      Prior to Visit Medications    Medication Sig Taking?  Authorizing Provider   nitrofurantoin (MACRODANTIN) 100 MG capsule  Yes Historical Provider, MD   Multiple Vitamins-Minerals (MULTIVITAMIN ADULT PO) Take by mouth Yes Historical Provider, MD   Multiple Vitamins-Minerals (VITAMIN D3 COMPLETE PO) Take by mouth Yes Historical Provider, MD   meclizine (ANTIVERT) 12.5 MG tablet Take 1 tablet by mouth 2 times daily as needed for Dizziness Yes MARIVEL Medeiros CNP   polyethylene glycol (MIRALAX) powder Take 17 g by mouth daily as needed (constipation) Yes MARIVEL Medeiros CNP   Esomeprazole Magnesium (NEXIUM 24HR) 20 MG TBEC Take 20 mg by mouth daily Yes MARIVEL Medeiros CNP   apixaban (ELIQUIS) 5 MG TABS tablet Take 5 mg by mouth Yes Historical Provider, MD   nystatin (MYCOSTATIN) 427932 UNIT/GM cream Apply topically 2 times daily. Yes Griselda Lynch MD   lisinopril (PRINIVIL;ZESTRIL) 5 MG tablet Take 5 mg by mouth daily. Yes Historical Provider, MD   propranolol (INDERAL) 20 MG tablet TAKE 1 TABLET THREE TIMES A DAY  Patient taking differently: 40 mg 2 times daily Yes Jocy Castro MD   cefdinir (OMNICEF) 300 MG capsule take 1 capsule by mouth twice a day for 7 days  Patient not taking: Reported on 10/11/2022  Historical Provider, MD   clotrimazole-betamethasone (LOTRISONE) 1-0.05 % cream Apply topically 2 times daily. Patient not taking: Reported on 10/11/2022  Iker Davis DPM   diclofenac (PENNSAID) 2 % SOLN Place 2 g onto the skin 2 times daily  Patient not taking: Reported on 10/11/2022  Toni Hernandez MD       Review of Systems     Review of Systems    Objective     BP (!) 158/60 (Site: Left Upper Arm, Position: Sitting, Cuff Size: Large Adult)   Pulse 70   Temp 98.3 °F (36.8 °C)   SpO2 99%   Physical Exam      Data Review       Health Maintenance Due   Topic Date Due    Shingles vaccine (1 of 2) Never done    Annual Wellness Visit (AWV)  10/19/2019    Flu vaccine (1) 08/01/2022           Patient given educational materials- see patient instructions. Discussed use, benefit, and side effects of prescribedmedications. All patient questions answered. Pt voiced understanding. Reviewedhealth maintenance.   Instructed to continue current medications, diet and exercise. Patient agreed with treatment plan. Follow up as directed.      Electronically signedby Clarisa Jarrett MD on 10/11/2022

## 2022-10-11 NOTE — PROGRESS NOTES
Pt is here today to establish care with our office     Pt would like an order for her shingles vacc     Pt would like to discuss her legs swelling a lot  States she does have lymphedema, uses the leg pumps, has never been on Lasix     Pt has a few spots on her tahmina and neck that she would like to look at     Pt has constipation, she will take a softener then it becomes too soft, she can not seem to find no in between     Pt has a pacemaker since 2002

## 2022-10-11 NOTE — PATIENT INSTRUCTIONS
Miralax dosing - Start with one tablespoon daily with 4oz water and double the dose every 3 days till you are able to have a good bowel movement without straining

## 2022-10-12 LAB
FERRITIN: 59 NG/ML (ref 13–150)
FOLATE: >20 NG/ML
IRON SATURATION: 31 % (ref 20–55)
IRON: 85 UG/DL (ref 37–145)
TOTAL IRON BINDING CAPACITY: 273 UG/DL (ref 250–450)
TSH SERPL DL<=0.05 MIU/L-ACNC: 3.96 UIU/ML (ref 0.3–5)
UNSATURATED IRON BINDING CAPACITY: 188 UG/DL (ref 112–347)
VITAMIN B-12: 476 PG/ML (ref 232–1245)

## 2022-12-08 ENCOUNTER — HOSPITAL ENCOUNTER (EMERGENCY)
Age: 87
Discharge: HOME | End: 2022-12-08
Attending: EMERGENCY MEDICINE
Payer: MEDICARE

## 2022-12-08 VITALS
DIASTOLIC BLOOD PRESSURE: 75 MMHG | TEMPERATURE: 98.5 F | OXYGEN SATURATION: 91 % | RESPIRATION RATE: 29 BRPM | HEART RATE: 61 BPM | SYSTOLIC BLOOD PRESSURE: 115 MMHG

## 2022-12-08 DIAGNOSIS — R04.0 RIGHT-SIDED EPISTAXIS: Primary | ICD-10-CM

## 2022-12-08 LAB
ABSOLUTE EOS #: 0.44 K/UL (ref 0–0.44)
ABSOLUTE IMMATURE GRANULOCYTE: 0.03 K/UL (ref 0–0.3)
ABSOLUTE LYMPH #: 2.57 K/UL (ref 1.1–3.7)
ABSOLUTE MONO #: 1.24 K/UL (ref 0.1–1.2)
ANION GAP SERPL CALCULATED.3IONS-SCNC: 8 MMOL/L (ref 9–17)
BASOPHILS # BLD: 0 % (ref 0–2)
BASOPHILS ABSOLUTE: 0.04 K/UL (ref 0–0.2)
BUN BLDV-MCNC: 35 MG/DL (ref 8–23)
CALCIUM SERPL-MCNC: 10.3 MG/DL (ref 8.6–10.4)
CHLORIDE BLD-SCNC: 100 MMOL/L (ref 98–107)
CO2: 25 MMOL/L (ref 20–31)
CREAT SERPL-MCNC: 0.9 MG/DL (ref 0.5–0.9)
EOSINOPHILS RELATIVE PERCENT: 5 % (ref 1–4)
GFR SERPL CREATININE-BSD FRML MDRD: >60 ML/MIN/1.73M2
GLUCOSE BLD-MCNC: 146 MG/DL (ref 70–99)
HCT VFR BLD CALC: 36.7 % (ref 36.3–47.1)
HEMOGLOBIN: 11.6 G/DL (ref 11.9–15.1)
IMMATURE GRANULOCYTES: 0 %
INR BLD: 1.1
LYMPHOCYTES # BLD: 28 % (ref 24–43)
MCH RBC QN AUTO: 32.3 PG (ref 25.2–33.5)
MCHC RBC AUTO-ENTMCNC: 31.6 G/DL (ref 28.4–34.8)
MCV RBC AUTO: 102.2 FL (ref 82.6–102.9)
MONOCYTES # BLD: 13 % (ref 3–12)
NRBC AUTOMATED: 0 PER 100 WBC
PDW BLD-RTO: 13.1 % (ref 11.8–14.4)
PLATELET # BLD: 271 K/UL (ref 138–453)
PMV BLD AUTO: 10.1 FL (ref 8.1–13.5)
POTASSIUM SERPL-SCNC: 4.3 MMOL/L (ref 3.7–5.3)
PROTHROMBIN TIME: 11.5 SEC (ref 9.1–12.3)
RBC # BLD: 3.59 M/UL (ref 3.95–5.11)
SEG NEUTROPHILS: 54 % (ref 36–65)
SEGMENTED NEUTROPHILS ABSOLUTE COUNT: 5.01 K/UL (ref 1.5–8.1)
SODIUM BLD-SCNC: 133 MMOL/L (ref 135–144)
WBC # BLD: 9.3 K/UL (ref 3.5–11.3)

## 2022-12-08 PROCEDURE — 99284 EMERGENCY DEPT VISIT MOD MDM: CPT

## 2022-12-08 PROCEDURE — 6370000000 HC RX 637 (ALT 250 FOR IP)

## 2022-12-08 PROCEDURE — 85610 PROTHROMBIN TIME: CPT

## 2022-12-08 PROCEDURE — 96374 THER/PROPH/DIAG INJ IV PUSH: CPT

## 2022-12-08 PROCEDURE — 80048 BASIC METABOLIC PNL TOTAL CA: CPT

## 2022-12-08 PROCEDURE — 6360000002 HC RX W HCPCS

## 2022-12-08 PROCEDURE — 30901 CONTROL OF NOSEBLEED: CPT

## 2022-12-08 PROCEDURE — 2500000003 HC RX 250 WO HCPCS

## 2022-12-08 PROCEDURE — 96375 TX/PRO/DX INJ NEW DRUG ADDON: CPT

## 2022-12-08 PROCEDURE — 85025 COMPLETE CBC W/AUTO DIFF WBC: CPT

## 2022-12-08 RX ORDER — TRANEXAMIC ACID 10 MG/ML
1000 INJECTION, SOLUTION INTRAVENOUS ONCE
Status: COMPLETED | OUTPATIENT
Start: 2022-12-08 | End: 2022-12-08

## 2022-12-08 RX ORDER — LISINOPRIL 10 MG/1
5 TABLET ORAL ONCE
Status: COMPLETED | OUTPATIENT
Start: 2022-12-08 | End: 2022-12-08

## 2022-12-08 RX ORDER — OXYMETAZOLINE HYDROCHLORIDE 0.05 G/100ML
1 SPRAY NASAL ONCE
Status: COMPLETED | OUTPATIENT
Start: 2022-12-08 | End: 2022-12-08

## 2022-12-08 RX ORDER — LISINOPRIL 10 MG/1
5 TABLET ORAL DAILY
Status: DISCONTINUED | OUTPATIENT
Start: 2022-12-08 | End: 2022-12-08

## 2022-12-08 RX ORDER — FENTANYL CITRATE 50 UG/ML
25 INJECTION, SOLUTION INTRAMUSCULAR; INTRAVENOUS ONCE
Status: COMPLETED | OUTPATIENT
Start: 2022-12-08 | End: 2022-12-08

## 2022-12-08 RX ORDER — LIDOCAINE HYDROCHLORIDE AND EPINEPHRINE 10; 10 MG/ML; UG/ML
20 INJECTION, SOLUTION INFILTRATION; PERINEURAL ONCE
Status: COMPLETED | OUTPATIENT
Start: 2022-12-08 | End: 2022-12-08

## 2022-12-08 RX ORDER — ONDANSETRON 2 MG/ML
4 INJECTION INTRAMUSCULAR; INTRAVENOUS ONCE
Status: COMPLETED | OUTPATIENT
Start: 2022-12-08 | End: 2022-12-08

## 2022-12-08 RX ORDER — PROPRANOLOL HYDROCHLORIDE 20 MG/1
20 TABLET ORAL ONCE
Status: COMPLETED | OUTPATIENT
Start: 2022-12-08 | End: 2022-12-08

## 2022-12-08 RX ADMIN — PROPRANOLOL HYDROCHLORIDE 20 MG: 20 TABLET ORAL at 06:40

## 2022-12-08 RX ADMIN — NASAL DECONGESTANT 1 SPRAY: 0.05 SPRAY NASAL at 05:40

## 2022-12-08 RX ADMIN — LISINOPRIL 5 MG: 10 TABLET ORAL at 06:24

## 2022-12-08 RX ADMIN — ONDANSETRON 4 MG: 2 INJECTION INTRAMUSCULAR; INTRAVENOUS at 06:40

## 2022-12-08 RX ADMIN — FENTANYL CITRATE 25 MCG: 50 INJECTION, SOLUTION INTRAMUSCULAR; INTRAVENOUS at 06:08

## 2022-12-08 RX ADMIN — LIDOCAINE HYDROCHLORIDE AND EPINEPHRINE 20 ML: 10; 10 INJECTION, SOLUTION INFILTRATION; PERINEURAL at 05:40

## 2022-12-08 RX ADMIN — TRANEXAMIC ACID 1000 MG: 10 INJECTION, SOLUTION INTRAVENOUS at 05:42

## 2022-12-08 ASSESSMENT — ENCOUNTER SYMPTOMS
ABDOMINAL PAIN: 0
SHORTNESS OF BREATH: 0
PHOTOPHOBIA: 0
VOMITING: 0
NAUSEA: 0
EYE PAIN: 0

## 2022-12-08 NOTE — ED PROVIDER NOTES
[address] [facility]     Emergency Department     Faculty Note/ Attestation      Pt Name: Wicho Caldwell                                       MRN: [id number]  Devaughngfnida 6/26/1931  Date of evaluation: 12/8/2022    Patients PCP:    Dali Kaye MD    Attestation  I performed a history and physical examination of the patient and discussed management with the resident. I reviewed the residents note and agree with the documented findings and plan of care. Any areas of disagreement are noted on the chart. I was personally present for the key portions of any procedures. I have documented in the chart those procedures where I was not present during the key portions. I have reviewed the emergency nurses triage note. I agree with the chief complaint, past medical history, past surgical history, allergies, medications, social and family history as documented unless otherwise noted below. For Physician Assistant/ Nurse Practitioner cases/documentation I have personally evaluated this patient and have completed at least one if not all key elements of the E/M (history, physical exam, and MDM). Additional findings are as noted. Initial Screens:        [place] Coma Scale  Eye Opening: Spontaneous  Best Verbal Response: Oriented  Best Motor Response: Obeys commands  [place] Coma Scale Score: 15    Vitals:    Vitals:    12/08/22 0520 12/08/22 0523   BP: (!) 199/68    Pulse: 73    Resp: 18    Temp:  98.5 °F (36.9 °C)   TempSrc:  Oral   SpO2: 96%        CHIEF COMPLAINT       Chief Complaint   Patient presents with    Epistaxis     59-year-old female with atrial fibrillation who takes apixaban for this.   Patient presents with atraumatic epistaxis is tried clamping without any relief patient notes significant bleeding persists patient has taken her blood pressure meds tonight but feels it may be related to her high blood pressure      EMERGENCY DEPARTMENT COURSE:     -------------------------  BP: (!) 199/68, Temp: 98.5 °F (36.9 °C), Heart Rate: 73, Resp: 18  Physical Exam  Constitutional:       Appearance: She is well-developed. She is not diaphoretic. HENT:      Head: Normocephalic and atraumatic. Right Ear: External ear normal.      Left Ear: External ear normal.      Nose:      Comments: Pt on arrival has rapid drainage from the right nare. I cannot see a source of bleeding due to the rapid bleeding that is occurring. Eyes:      General: No scleral icterus. Right eye: No discharge. Left eye: No discharge. Neck:      Trachea: No tracheal deviation. Pulmonary:      Effort: Pulmonary effort is normal. No respiratory distress. Breath sounds: No stridor. Musculoskeletal:         General: Normal range of motion. Cervical back: Normal range of motion. Skin:     General: Skin is warm and dry. Neurological:      Mental Status: She is alert and oriented to person, place, and time. Coordination: Coordination normal.   Psychiatric:         Behavior: Behavior normal.         Comments  71-year-old female with blood thinners atrial fibrillation and having significant bleeding the patient was given oxymetazoline nasal spray clamp and pressure clots were evacuated however unable to identify a source of bleeding that could be cauterized. Given the amount of bleeding TXA lidocaine with epinephrine and Rhino packing was applied pressure was increased patient given pain medication to help tolerate the pressure to maintain tamponade of the wound. Bleeding better controlled at this time will continue to observe and re assess.     Patient's blood pressure is high we will give an additional dose of patient's medication orally    ED Course as of 12/08/22 0653   Thu Dec 08, 2022   0650 No bleeding on re evaluation [WK]      ED Course User Index  [WK] Addis Roland DO   Signed out to Dr. Tariq Sneed at the end of my shift to re evaluate and ensure bleeding has not returned    Adam Martinez DO Geena,, , RDMS.   Attending Emergency Physician         Daja Kendrick,   12/08/22 6426

## 2022-12-08 NOTE — ED NOTES
Message sent to pharmacy to send missing dose  Writer called pharmacy as well  Will continue plan of care     Manjula Oleary, RN  12/08/22 1232

## 2022-12-08 NOTE — DISCHARGE INSTRUCTIONS
You were seen in the emergency department for ongoing nosebleed over 2 hours. Any nosebleed lasting longer than 15 minutes requires medical attention. A nasal tamponade was placed in your right nostril, it is important to keep this in place until you are seen by an ear, nose, throat doctor [otolaryngologist]. Call Dr. Rakesh Wang, the otolaryngologist, at the number below to schedule an appointment to be seen in 5 to 7 days. The nasal tamponade placed in your right nostril must stay in place, not to be removed, until seen by a ear nose throat doctor. If you experience bleeding from your nose, regardless of which nostril, pinch your nose and lean forwards keeping your nose pinched for 15 minutes. If it is still bleeding seek medical attention immediately. Return to the emergency department if you have any pain in your nose, fever, chills, discharge coming from your nose, difficulty breathing, chest pain, or any other acute concern.

## 2022-12-08 NOTE — ED PROVIDER NOTES
[facility] ED  Emergency Department Encounter  Emergency Medicine Resident     Pt Name:Natalya Robison  MRN: [id number]  [place] 6/26/1931  Date of evaluation: 12/8/22  PCP:  Flora Patel MD      [address] [place] [address]       Chief Complaint   Patient presents with    Epistaxis       HISTORY OF PRESENT ILLNESS  (Location/Symptom, Timing/Onset, Context/Setting, Quality, Duration, Modifying Factors, Severity.)      Mahamed Daniels is a pleasant 80 y.o. female who presents with 2 hours of epistaxis. As per patient, she was brushing her teeth and rinsing out her mouth with mouthwash earlier this evening and she first noticed this. Patient is on Eliquis for A. fib, has a history of hypertension currently treated on lisinopril and propanolol. Patient denies trauma, falls, chest pain, dyspnea, visual changes, headache, or new weakness/numbness. She has a distant history of nasal bone fracture. PAST MEDICAL / SURGICAL / SOCIAL / FAMILY HISTORY      has a past medical history of Atrial fibrillation ([address] Cancer ([address] GERD (gastroesophageal reflux disease), Hypertension, Osteoarthritis, and Sick sinus syndrome ([address] has a past surgical history that includes Hysterectomy; Appendectomy; Salpingo-oophorectomy; Breast surgery;  Cataract removal; pacemaker placement; and Hysterectomy, total abdominal.    Social History     Socioeconomic History    Marital status:      Spouse name: Not on file    Number of children: Not on file    Years of education: Not on file    Highest education level: Not on file   Occupational History    Not on file   Tobacco Use    Smoking status: Never    Smokeless tobacco: Never   Vaping Use    Vaping Use: Never used   Substance and Sexual Activity    Alcohol use: No    Drug use: No    Sexual activity: Never   Other Topics Concern    Not on file   Social History Narrative    Not on file     Social Determinants of Health     Financial Resource Strain: Low Risk Difficulty of Paying Living Expenses: Not very hard   Food Insecurity: No Food Insecurity    Worried About Running Out of Food in the Last Year: Never true    Ran Out of Food in the Last Year: Never true   Transportation Needs: Not on file   Physical Activity: Not on file   Stress: Not on file   Social Connections: Not on file   Intimate Partner Violence: Not on file   Housing Stability: Not on file       No family history on file. Allergies:  Neosporin [neomycin-polymyxin-gramicidin], Bactrim, Ciprofloxacin, Codeine, Erythromycin, Nalbuphine, and Nubain [nalbuphine hcl]    Home Medications:  Prior to Admission medications    Medication Sig Start Date End Date Taking? Authorizing Provider   nitrofurantoin (MACRODANTIN) 100 MG capsule  9/15/21   Historical Provider, MD   Multiple Vitamins-Minerals (MULTIVITAMIN ADULT PO) Take by mouth    Historical Provider, MD   Multiple Vitamins-Minerals (VITAMIN D3 COMPLETE PO) Take by mouth    Historical Provider, MD   meclizine (ANTIVERT) 12.5 MG tablet Take 1 tablet by mouth 2 times daily as needed for Dizziness 10/15/19   MARIVEL Kay CNP   polyethylene glycol (MIRALAX) powder Take 17 g by mouth daily as needed (constipation) 10/15/19   MARIVEL Kay CNP   Esomeprazole Magnesium (NEXIUM 24HR) 20 MG TBEC Take 20 mg by mouth daily 10/15/19   MARIVEL Kya CNP   apixaban (ELIQUIS) 5 MG TABS tablet Take 5 mg by mouth 6/22/18   Historical Provider, MD   nystatin (MYCOSTATIN) 494988 UNIT/GM cream Apply topically 2 times daily. 6/23/15   Catrachita Salas MD   lisinopril (PRINIVIL;ZESTRIL) 5 MG tablet Take 5 mg by mouth daily. Historical Provider, MD   propranolol (INDERAL) 20 MG tablet TAKE 1 TABLET THREE TIMES A DAY  Patient taking differently: 40 mg 2 times daily 12/27/13   Jaky Do MD       REVIEW OF SYSTEMS    (2-9 systems for level 4, 10 or more for level 5)      Review of Systems   Constitutional:  Negative for chills, diaphoresis and fever. HENT:  Positive for nosebleeds. Eyes:  Negative for photophobia, pain and visual disturbance. Respiratory:  Negative for shortness of breath. Cardiovascular:  Negative for chest pain. Gastrointestinal:  Negative for abdominal pain, nausea and vomiting. Skin:  Negative for rash and wound. Neurological:  Negative for syncope, weakness, numbness and headaches. Hematological:  Bruises/bleeds easily. PHYSICAL EXAM   (up to 7 for level 4, 8 or more for level 5)      INITIAL VITALS:   /75   Pulse 61   Temp 98.5 °F (36.9 °C) (Oral)   Resp 29   SpO2 91%     Physical Exam  Vitals (Hypertension noted, patient states that she is compliant with her antihypertensives) reviewed. Constitutional:       General: She is not in acute distress. Appearance: She is not toxic-appearing. HENT:      Head: Normocephalic. Nose: No nasal tenderness. Right Nostril: Epistaxis present. No septal hematoma. Left Nostril: No epistaxis or septal hematoma. Mouth/Throat:      Comments: That stained with blood, clots evacuated via suction. Cardiovascular:      Rate and Rhythm: Normal rate. Rhythm irregular. Pulmonary:      Effort: Pulmonary effort is normal. No respiratory distress. Abdominal:      Palpations: Abdomen is soft. Tenderness: There is no abdominal tenderness. Skin:     General: Skin is warm and dry. Capillary Refill: Capillary refill takes less than 2 seconds. Neurological:      Mental Status: She is alert and oriented to person, place, and time.        DIFFERENTIAL  DIAGNOSIS     PLAN (LABS / IMAGING / EKG):  Orders Placed This Encounter   Procedures    CBC with Auto Differential    Basic Metabolic Panel    Protime-INR       MEDICATIONS ORDERED:  Orders Placed This Encounter   Medications    tranexamic acid-NaCl IVPB premix 1,000 mg    oxymetazoline (AFRIN) 0.05 % nasal spray 1 spray    lidocaine-EPINEPHrine 1 %-1:978010 injection 20 mL    fentaNYL (SUBLIMAZE) injection 25 mcg    DISCONTD: lisinopril (PRINIVIL;ZESTRIL) tablet 5 mg    propranolol (INDERAL) tablet 20 mg    lisinopril (PRINIVIL;ZESTRIL) tablet 5 mg    ondansetron (ZOFRAN) injection 4 mg       DDX: Epistaxis, septal hematoma, hematemesis were considered. Blood is coming exclusively from right naris, clots visible in the mouth-no active bleeding noted in the mouth, patient not coughing. Hematemesis unlikely. Given that blood is actively being extruded from the right naris, right epistaxis most likely. Will obtain CBC given prolonged bleeding, BMP given patient's age and comorbidities, and PT/INR given the patient's anticoagulated status. Proceed to apply Afrin, apply nasal tamponade with TXA and lidocaine/epinephrine.     DIAGNOSTIC RESULTS / EMERGENCY DEPARTMENT COURSE / MDM   LAB RESULTS:  Results for orders placed or performed during the hospital encounter of 12/08/22   CBC with Auto Differential   Result Value Ref Range    WBC 9.3 3.5 - 11.3 k/uL    RBC 3.59 (L) 3.95 - 5.11 m/uL    Hemoglobin 11.6 (L) 11.9 - 15.1 g/dL    Hematocrit 36.7 36.3 - 47.1 %    .2 82.6 - 102.9 fL    MCH 32.3 25.2 - 33.5 pg    MCHC 31.6 28.4 - 34.8 g/dL    RDW 13.1 11.8 - 14.4 %    Platelets 429 849 - 245 k/uL    MPV 10.1 8.1 - 13.5 fL    NRBC Automated 0.0 0.0 per 100 WBC    Seg Neutrophils 54 36 - 65 %    Lymphocytes 28 24 - 43 %    Monocytes 13 (H) 3 - 12 %    Eosinophils % 5 (H) 1 - 4 %    Basophils 0 0 - 2 %    Immature Granulocytes 0 0 %    Segs Absolute 5.01 1.50 - 8.10 k/uL    Absolute Lymph # 2.57 1.10 - 3.70 k/uL    Absolute Mono # 1.24 (H) 0.10 - 1.20 k/uL    Absolute Eos # 0.44 0.00 - 0.44 k/uL    Basophils Absolute 0.04 0.00 - 0.20 k/uL    Absolute Immature Granulocyte 0.03 0.00 - 0.30 k/uL   Basic Metabolic Panel   Result Value Ref Range    Glucose 146 (H) 70 - 99 mg/dL    BUN 35 (H) 8 - 23 mg/dL    Creatinine 0.90 0.50 - 0.90 mg/dL    Est, Glom Filt Rate >60 >60 mL/min/1.73m2    Calcium 10.3 8.6 - 10.4 mg/dL    Sodium 133 (L) 135 - 144 mmol/L    Potassium 4.3 3.7 - 5.3 mmol/L    Chloride 100 98 - 107 mmol/L    CO2 25 20 - 31 mmol/L    Anion Gap 8 (L) 9 - 17 mmol/L   Protime-INR   Result Value Ref Range    Protime 11.5 9.1 - 12.3 sec    INR 1.1        IMPRESSION: 80-year-old female on Eliquis for A. fib presenting with a 2-hour history of right naris epistaxis. RADIOLOGY:  No orders to display       EKG    All EKG's are interpreted by the Emergency Department Physician who either signs or Co-signs this chart in the absence of a cardiologist.    EMERGENCY DEPARTMENT COURSE:  Nasal clamp placed. History, physical examination performed. 2 sprays Afrin given into the right naris and clamp replaced. Clamp was placed for approximately 6 minutes, removed and reevaluated. Bleeding rate similar unchanged, no direct focus of bleed identified, no obvious septal hematoma noted. Patient blew nose vigorously multiple times with 2 clots evacuated. Nasal tamponade soaked in 1000 mg TXA in 100 mL solution, mixed with 4 mL 1% lidocaine with epi. Tamponade was inserted into the right naris, inflated with approximately 15 mL air. Upon insertion, large clot evacuated from mouth as patient coughed it up. No further bleeding noted. CBC unremarkable for anemia (at patient's baseline), no leukocytosis. BMP unremarkable. PT/INR within normal limits. 25 mcg fentanyl provided for pain secondary to tamponade, ondansetron provided for nausea secondary to fentanyl. Discharged on reevaluation, no bleeding evident. Will discharge patient with ENT follow-up and return precautions. No notes of EC Admission Criteria type on file. PROCEDURES:    CONSULTS:  None    CRITICAL CARE:    FINAL IMPRESSION      1.  Right-sided epistaxis          DISPOSITION / PLAN     DISPOSITION Decision To Discharge 12/08/2022 07:23:22 AM      PATIENT REFERRED TO:  Toña Payan MD  . [address] [facility] [address]  [phone]    Schedule an appointment as soon as possible for a visit       [facility] ED  [address] [facility] [address] [phone]  Go to   If symptoms worsen    DISCHARGE MEDICATIONS:  New Prescriptions    No medications on file       Speedy Harley MD  Emergency Medicine Resident    (Please note that portions of thisnote were completed with a voice recognition program.  Efforts were made to edit the dictations but occasionally words are mis-transcribed.)       Speedy Harley MD  Resident  12/08/22 4773

## 2022-12-08 NOTE — ED NOTES
Pt presents to the ER via EMS for nose bleed. Pt states nose started bleeding out of her right nostril after she was brushing her teeth. Pt states no injury or hitting nose, just started bleeding out of nowhere. Pt states her nose is broken, but that was awhile ago. Pt states it was bleeding for couple hours before calling 911. Pt states she takes blood thinners for a-fib. Pt otherwise in NAD, pt hypertensive but hasn't taken today's BP medications. Pt A&O x4. Pt placed on full cardiac monitor, line established, labs drawn. Dr. Villagomez Comings at bedside to further assess pt.      Laura Chopra RN  12/08/22 1253

## 2022-12-08 NOTE — ED NOTES
Pt states she is feeling nauseous and sick to her stomach after receiving fentanyl   Resident made aware of complaint  Will continue plan of care     Abril Quiles RN  12/08/22 4090

## 2022-12-08 NOTE — PROGRESS NOTES
I signed up for this patient in error. I did not see this patient. I did not participate in the evlauation or treatment of this patient.     Electronically signed by Ana Red DO on 12/8/2022 at 7:23 AM

## 2023-03-29 ASSESSMENT — ENCOUNTER SYMPTOMS
DIARRHEA: 0
CHOKING: 0
ABDOMINAL PAIN: 0
NAUSEA: 0
ANAL BLEEDING: 0
COUGH: 0
CONSTIPATION: 0
VOMITING: 0
SHORTNESS OF BREATH: 0
CHEST TIGHTNESS: 0
BLOOD IN STOOL: 0
WHEEZING: 0

## 2023-03-29 ASSESSMENT — VISUAL ACUITY: OU: 1

## 2023-04-13 PROBLEM — M54.50 CHRONIC MIDLINE LOW BACK PAIN WITHOUT SCIATICA: Status: ACTIVE | Noted: 2023-04-13

## 2023-04-13 PROBLEM — G89.29 CHRONIC MIDLINE LOW BACK PAIN WITHOUT SCIATICA: Status: ACTIVE | Noted: 2023-04-13

## 2023-04-13 PROBLEM — N39.46 MIXED STRESS AND URGE URINARY INCONTINENCE: Status: ACTIVE | Noted: 2023-04-13

## 2023-04-13 PROBLEM — L89.159 PRESSURE INJURY OF SKIN OF SACRAL REGION: Status: ACTIVE | Noted: 2023-04-13

## 2023-04-20 ENCOUNTER — TELEPHONE (OUTPATIENT)
Dept: FAMILY MEDICINE CLINIC | Age: 88
End: 2023-04-20

## 2023-04-20 NOTE — TELEPHONE ENCOUNTER
Received letter from [employer] that pt received a temp supply of esomepra mag    Will do PA if/when needed     Letter attached

## 2023-04-27 DIAGNOSIS — G89.29 CHRONIC MIDLINE LOW BACK PAIN WITHOUT SCIATICA: ICD-10-CM

## 2023-04-27 DIAGNOSIS — M54.50 CHRONIC MIDLINE LOW BACK PAIN WITHOUT SCIATICA: ICD-10-CM

## 2023-06-12 NOTE — TELEPHONE ENCOUNTER
Reason for Disposition   Age > 50 years    Answer Assessment - Initial Assessment Questions  1. SEVERITY: \"How bad is the pain? \"  (e.g., Scale 1-10; mild, moderate, or severe)    - MILD (1-3): complains slightly about urination hurting    - MODERATE (4-7): interferes with normal activities      - SEVERE (8-10): excruciating, unwilling or unable to urinate because of the pain       mild  2. FREQUENCY: \"How many times have you had painful urination today? \"       A lot  3. PATTERN: \"Is pain present every time you urinate or just sometimes? \"       yes  4. ONSET: \"When did the painful urination start? \"       4 days ago  5. FEVER: \"Do you have a fever? \" If so, ask: \"What is your temperature, how was it measured, and when did it start? \"      No  6. PAST UTI: \"Have you had a urine infection before? \" If so, ask: \"When was the last time? \" and \"What happened that time? \"       yes  7. CAUSE: \"What do you think is causing the painful urination? \"  (e.g., UTI, scratch, Herpes sore)      no  8. OTHER SYMPTOMS: \"Do you have any other symptoms? \" (e.g., flank pain, vaginal discharge, genital sores, urgency, blood in urine)      urgency  9. PREGNANCY: \"Is there any chance you are pregnant? \" \"When was your last menstrual period? \"      no    Protocols used: URINATION PAIN [facility]    Patient called pre-service center [facility] with red flag complaint. Brief description of triage: pt has new UTI symptoms for the last 4 days. Triage indicates for patient to be seen today in office    Care advice provided, patient verbalizes understanding; denies any other questions or concerns; instructed to call back for any new or worsening symptoms. Writer provided warm transfer to garcia at [facility] for appointment scheduling. Please do not respond to the triage nurse through this encounter. Any subsequent communication should be directly with the patient. Xeljanz Counseling: I discussed with the patient the risks of Xeljanz therapy including increased risk of infection, liver issues, headache, diarrhea, or cold symptoms. Live vaccines should be avoided. They were instructed to call if they have any problems.

## 2023-07-13 ENCOUNTER — APPOINTMENT (OUTPATIENT)
Dept: CT IMAGING | Facility: CLINIC | Age: 88
DRG: 394 | End: 2023-07-13
Payer: MEDICARE

## 2023-07-13 ENCOUNTER — APPOINTMENT (OUTPATIENT)
Dept: GENERAL RADIOLOGY | Age: 88
DRG: 394 | End: 2023-07-13
Payer: MEDICARE

## 2023-07-13 ENCOUNTER — HOSPITAL ENCOUNTER (INPATIENT)
Age: 88
LOS: 4 days | Discharge: HOME | DRG: 394 | End: 2023-07-17
Attending: SPECIALIST | Admitting: INTERNAL MEDICINE
Payer: MEDICARE

## 2023-07-13 DIAGNOSIS — K56.609 SMALL BOWEL OBSTRUCTION (HCC): Primary | ICD-10-CM

## 2023-07-13 PROBLEM — R10.9 ACUTE ABDOMINAL PAIN: Status: ACTIVE | Noted: 2023-07-13

## 2023-07-13 LAB
ALBUMIN SERPL-MCNC: 3.6 G/DL (ref 3.5–5.2)
ALBUMIN/GLOB SERPL: 0.8 {RATIO} (ref 1–2.5)
ALP SERPL-CCNC: 169 U/L (ref 35–104)
ALT SERPL-CCNC: 14 U/L (ref 5–33)
ANION GAP: 13 MMOL/L (ref 7–16)
AST SERPL-CCNC: 25 U/L
BASOPHILS # BLD: 0 K/UL (ref 0–0.2)
BASOPHILS NFR BLD: 0 % (ref 0–2)
BILIRUB DIRECT SERPL-MCNC: 0.4 MG/DL
BILIRUB INDIRECT SERPL-MCNC: 0.7 MG/DL (ref 0–1)
BILIRUB SERPL-MCNC: 1.1 MG/DL (ref 0.3–1.2)
BUN BLD-MCNC: 32 MG/DL (ref 8–26)
CA-I BLD-SCNC: 1.3 MMOL/L (ref 1.15–1.33)
CHLORIDE BLD-SCNC: 94 MMOL/L (ref 98–107)
CO2 BLD CALC-SCNC: 29 MMOL/L (ref 22–30)
EGFR, POC: 47 ML/MIN/1.73M2
EKG ATRIAL RATE: 75 BPM
EKG Q-T INTERVAL: 382 MS
EKG QRS DURATION: 84 MS
EKG QTC CALCULATION (BAZETT): 432 MS
EKG R AXIS: -7 DEGREES
EKG T AXIS: -130 DEGREES
EKG VENTRICULAR RATE: 77 BPM
EOSINOPHIL # BLD: 0.2 K/UL (ref 0–0.4)
EOSINOPHILS RELATIVE PERCENT: 1 % (ref 1–4)
ERYTHROCYTE [DISTWIDTH] IN BLOOD BY AUTOMATED COUNT: 14.8 % (ref 12.5–15.4)
GLUCOSE BLD-MCNC: 138 MG/DL (ref 74–100)
HCT VFR BLD AUTO: 35.9 % (ref 36–46)
HGB BLD-MCNC: 12 G/DL (ref 12–16)
LACTATE BLDV-SCNC: 1 MMOL/L (ref 0.5–1.9)
LIPASE SERPL-CCNC: 74 U/L (ref 13–60)
LYMPHOCYTES # BLD: 12 % (ref 24–44)
LYMPHOCYTES NFR BLD: 1.5 K/UL (ref 1–4.8)
MAGNESIUM SERPL-MCNC: 2.2 MG/DL (ref 1.6–2.6)
MCH RBC QN AUTO: 33.1 PG (ref 26–34)
MCHC RBC AUTO-ENTMCNC: 33.4 G/DL (ref 31–37)
MCV RBC AUTO: 99.2 FL (ref 80–100)
MONOCYTES NFR BLD: 1.1 K/UL (ref 0.1–1.2)
MONOCYTES NFR BLD: 9 % (ref 2–11)
NEUTROPHILS NFR BLD: 78 % (ref 36–66)
NEUTS SEG NFR BLD: 9.4 K/UL (ref 1.8–7.7)
PLATELET # BLD AUTO: 306 K/UL (ref 140–450)
PMV BLD AUTO: 7.2 FL (ref 6–12)
POC CREATININE: 1.1 MG/DL (ref 0.51–1.19)
POC LACTIC ACID: 1 MMOL/L (ref 0.56–1.39)
POTASSIUM BLD-SCNC: 3.7 MMOL/L (ref 3.5–4.5)
PROT SERPL-MCNC: 8 G/DL (ref 6.4–8.3)
RBC # BLD AUTO: 3.62 M/UL (ref 4–5.2)
SODIUM BLD-SCNC: 135 MMOL/L (ref 138–146)
TROPONIN I SERPL HS-MCNC: 25 NG/L (ref 0–14)
WBC OTHER # BLD: 12.1 K/UL (ref 3.5–11)

## 2023-07-13 PROCEDURE — 83735 ASSAY OF MAGNESIUM: CPT

## 2023-07-13 PROCEDURE — 74250 X-RAY XM SM INT 1CNTRST STD: CPT

## 2023-07-13 PROCEDURE — 99222 1ST HOSP IP/OBS MODERATE 55: CPT | Performed by: INTERNAL MEDICINE

## 2023-07-13 PROCEDURE — 6360000004 HC RX CONTRAST MEDICATION: Performed by: SPECIALIST

## 2023-07-13 PROCEDURE — 83690 ASSAY OF LIPASE: CPT

## 2023-07-13 PROCEDURE — 80051 ELECTROLYTE PANEL: CPT

## 2023-07-13 PROCEDURE — 93005 ELECTROCARDIOGRAM TRACING: CPT | Performed by: SPECIALIST

## 2023-07-13 PROCEDURE — 82565 ASSAY OF CREATININE: CPT

## 2023-07-13 PROCEDURE — 84484 ASSAY OF TROPONIN QUANT: CPT

## 2023-07-13 PROCEDURE — 93005 ELECTROCARDIOGRAM TRACING: CPT | Performed by: INTERNAL MEDICINE

## 2023-07-13 PROCEDURE — 2580000003 HC RX 258: Performed by: SPECIALIST

## 2023-07-13 PROCEDURE — 96365 THER/PROPH/DIAG IV INF INIT: CPT

## 2023-07-13 PROCEDURE — 2580000003 HC RX 258: Performed by: INTERNAL MEDICINE

## 2023-07-13 PROCEDURE — 82947 ASSAY GLUCOSE BLOOD QUANT: CPT

## 2023-07-13 PROCEDURE — 2060000000 HC ICU INTERMEDIATE R&B

## 2023-07-13 PROCEDURE — 6370000000 HC RX 637 (ALT 250 FOR IP): Performed by: INTERNAL MEDICINE

## 2023-07-13 PROCEDURE — 83605 ASSAY OF LACTIC ACID: CPT

## 2023-07-13 PROCEDURE — 85027 COMPLETE CBC AUTOMATED: CPT

## 2023-07-13 PROCEDURE — 74177 CT ABD & PELVIS W/CONTRAST: CPT

## 2023-07-13 PROCEDURE — 96366 THER/PROPH/DIAG IV INF ADDON: CPT

## 2023-07-13 PROCEDURE — 82330 ASSAY OF CALCIUM: CPT

## 2023-07-13 PROCEDURE — 96375 TX/PRO/DX INJ NEW DRUG ADDON: CPT

## 2023-07-13 PROCEDURE — 36415 COLL VENOUS BLD VENIPUNCTURE: CPT

## 2023-07-13 PROCEDURE — 84520 ASSAY OF UREA NITROGEN: CPT

## 2023-07-13 PROCEDURE — 80076 HEPATIC FUNCTION PANEL: CPT

## 2023-07-13 PROCEDURE — 99285 EMERGENCY DEPT VISIT HI MDM: CPT

## 2023-07-13 PROCEDURE — 6360000002 HC RX W HCPCS: Performed by: SPECIALIST

## 2023-07-13 RX ORDER — SODIUM CHLORIDE 0.9 % (FLUSH) 0.9 %
3 SYRINGE (ML) INJECTION EVERY 8 HOURS
Status: DISCONTINUED | OUTPATIENT
Start: 2023-07-13 | End: 2023-07-13

## 2023-07-13 RX ORDER — OXYBUTYNIN CHLORIDE 15 MG/1
15 TABLET, EXTENDED RELEASE ORAL DAILY
COMMUNITY

## 2023-07-13 RX ORDER — SODIUM CHLORIDE 0.9 % (FLUSH) 0.9 %
5-40 SYRINGE (ML) INJECTION PRN
Status: DISCONTINUED | OUTPATIENT
Start: 2023-07-13 | End: 2023-07-17 | Stop reason: HOSPADM

## 2023-07-13 RX ORDER — CALCIUM CARBONATE 500 MG/1
1 TABLET, CHEWABLE ORAL EVERY 4 HOURS PRN
COMMUNITY

## 2023-07-13 RX ORDER — SPIRONOLACTONE 25 MG/1
25 TABLET ORAL DAILY
Status: ON HOLD | COMMUNITY
End: 2023-07-17 | Stop reason: HOSPADM

## 2023-07-13 RX ORDER — 0.9 % SODIUM CHLORIDE 0.9 %
250 INTRAVENOUS SOLUTION INTRAVENOUS ONCE
Status: COMPLETED | OUTPATIENT
Start: 2023-07-13 | End: 2023-07-13

## 2023-07-13 RX ORDER — OXYBUTYNIN CHLORIDE 5 MG/1
5 TABLET, EXTENDED RELEASE ORAL DAILY
Status: DISCONTINUED | OUTPATIENT
Start: 2023-07-13 | End: 2023-07-17 | Stop reason: HOSPADM

## 2023-07-13 RX ORDER — ACETAMINOPHEN 650 MG/1
650 SUPPOSITORY RECTAL EVERY 6 HOURS PRN
Status: DISCONTINUED | OUTPATIENT
Start: 2023-07-13 | End: 2023-07-17 | Stop reason: HOSPADM

## 2023-07-13 RX ORDER — SODIUM CHLORIDE 0.9 % (FLUSH) 0.9 %
5-40 SYRINGE (ML) INJECTION EVERY 12 HOURS SCHEDULED
Status: DISCONTINUED | OUTPATIENT
Start: 2023-07-13 | End: 2023-07-17 | Stop reason: HOSPADM

## 2023-07-13 RX ORDER — ONDANSETRON 4 MG/1
4 TABLET, ORALLY DISINTEGRATING ORAL EVERY 8 HOURS PRN
Status: DISCONTINUED | OUTPATIENT
Start: 2023-07-13 | End: 2023-07-17 | Stop reason: HOSPADM

## 2023-07-13 RX ORDER — SODIUM CHLORIDE 9 MG/ML
INJECTION, SOLUTION INTRAVENOUS PRN
Status: DISCONTINUED | OUTPATIENT
Start: 2023-07-13 | End: 2023-07-17 | Stop reason: HOSPADM

## 2023-07-13 RX ORDER — PROPRANOLOL HYDROCHLORIDE 10 MG/1
40 TABLET ORAL 2 TIMES DAILY
Status: DISCONTINUED | OUTPATIENT
Start: 2023-07-13 | End: 2023-07-17 | Stop reason: HOSPADM

## 2023-07-13 RX ORDER — ENOXAPARIN SODIUM 100 MG/ML
40 INJECTION SUBCUTANEOUS DAILY
Status: DISCONTINUED | OUTPATIENT
Start: 2023-07-13 | End: 2023-07-14

## 2023-07-13 RX ORDER — ONDANSETRON 2 MG/ML
4 INJECTION INTRAMUSCULAR; INTRAVENOUS EVERY 6 HOURS PRN
Status: DISCONTINUED | OUTPATIENT
Start: 2023-07-13 | End: 2023-07-17 | Stop reason: HOSPADM

## 2023-07-13 RX ORDER — 0.9 % SODIUM CHLORIDE 0.9 %
70 INTRAVENOUS SOLUTION INTRAVENOUS ONCE
Status: COMPLETED | OUTPATIENT
Start: 2023-07-13 | End: 2023-07-13

## 2023-07-13 RX ORDER — POLYETHYLENE GLYCOL 3350 17 G/17G
17 POWDER, FOR SOLUTION ORAL DAILY PRN
Status: DISCONTINUED | OUTPATIENT
Start: 2023-07-13 | End: 2023-07-17 | Stop reason: HOSPADM

## 2023-07-13 RX ORDER — ONDANSETRON 2 MG/ML
4 INJECTION INTRAMUSCULAR; INTRAVENOUS ONCE
Status: COMPLETED | OUTPATIENT
Start: 2023-07-13 | End: 2023-07-13

## 2023-07-13 RX ORDER — ACETAMINOPHEN 325 MG/1
650 TABLET ORAL EVERY 6 HOURS PRN
Status: DISCONTINUED | OUTPATIENT
Start: 2023-07-13 | End: 2023-07-17 | Stop reason: HOSPADM

## 2023-07-13 RX ORDER — PROPRANOLOL HYDROCHLORIDE 40 MG/1
40 TABLET ORAL 2 TIMES DAILY
COMMUNITY

## 2023-07-13 RX ORDER — GUAIFENESIN 400 MG/1
400 TABLET ORAL 2 TIMES DAILY PRN
COMMUNITY

## 2023-07-13 RX ADMIN — IOPAMIDOL 75 ML: 755 INJECTION, SOLUTION INTRAVENOUS at 03:50

## 2023-07-13 RX ADMIN — PIPERACILLIN AND TAZOBACTAM 3375 MG: 3; .375 INJECTION, POWDER, FOR SOLUTION INTRAVENOUS at 06:20

## 2023-07-13 RX ADMIN — ONDANSETRON 4 MG: 2 INJECTION INTRAMUSCULAR; INTRAVENOUS at 03:16

## 2023-07-13 RX ADMIN — SODIUM CHLORIDE 70 ML: 9 INJECTION, SOLUTION INTRAVENOUS at 03:51

## 2023-07-13 RX ADMIN — SODIUM CHLORIDE, PRESERVATIVE FREE 3 ML: 5 INJECTION INTRAVENOUS at 03:51

## 2023-07-13 RX ADMIN — OXYBUTYNIN CHLORIDE 5 MG: 5 TABLET, EXTENDED RELEASE ORAL at 15:55

## 2023-07-13 RX ADMIN — SODIUM CHLORIDE 250 ML: 9 INJECTION, SOLUTION INTRAVENOUS at 03:16

## 2023-07-13 RX ADMIN — SODIUM CHLORIDE, PRESERVATIVE FREE 10 ML: 5 INJECTION INTRAVENOUS at 20:12

## 2023-07-13 ASSESSMENT — ENCOUNTER SYMPTOMS
CHOKING: 0
DIARRHEA: 0
BACK PAIN: 0
SORE THROAT: 0
BLOOD IN STOOL: 0
NAUSEA: 1
ABDOMINAL PAIN: 1
WHEEZING: 0
NAUSEA: 0
SHORTNESS OF BREATH: 0
CONSTIPATION: 0
VOMITING: 1
COUGH: 0
ABDOMINAL DISTENTION: 1

## 2023-07-13 ASSESSMENT — PAIN SCALES - GENERAL
PAINLEVEL_OUTOF10: 0
PAINLEVEL_OUTOF10: 0

## 2023-07-14 ENCOUNTER — TELEPHONE (OUTPATIENT)
Dept: FAMILY MEDICINE CLINIC | Age: 88
End: 2023-07-14

## 2023-07-14 LAB
ALBUMIN SERPL-MCNC: 3 G/DL (ref 3.5–5.2)
ANION GAP SERPL CALCULATED.3IONS-SCNC: 10 MMOL/L (ref 9–17)
BUN SERPL-MCNC: 33 MG/DL (ref 8–23)
BUN/CREAT SERPL: 25 (ref 9–20)
CALCIUM SERPL-MCNC: 9.9 MG/DL (ref 8.6–10.4)
CHLORIDE SERPL-SCNC: 99 MMOL/L (ref 98–107)
CO2 SERPL-SCNC: 28 MMOL/L (ref 20–31)
CREAT SERPL-MCNC: 1.3 MG/DL (ref 0.5–0.9)
ERYTHROCYTE [DISTWIDTH] IN BLOOD BY AUTOMATED COUNT: 14.6 % (ref 11.8–14.4)
GFR SERPL CREATININE-BSD FRML MDRD: 39 ML/MIN/1.73M2
GLUCOSE SERPL-MCNC: 88 MG/DL (ref 70–99)
HCT VFR BLD AUTO: 32.9 % (ref 36.3–47.1)
HGB BLD-MCNC: 10.3 G/DL (ref 11.9–15.1)
MAGNESIUM SERPL-MCNC: 2.4 MG/DL (ref 1.6–2.6)
MCH RBC QN AUTO: 33.8 PG (ref 25.2–33.5)
MCHC RBC AUTO-ENTMCNC: 31.3 G/DL (ref 28.4–34.8)
MCV RBC AUTO: 107.9 FL (ref 82.6–102.9)
NRBC BLD-RTO: 0 PER 100 WBC
PHOSPHATE SERPL-MCNC: 4 MG/DL (ref 2.6–4.5)
PLATELET # BLD AUTO: 217 K/UL (ref 138–453)
PMV BLD AUTO: 9.5 FL (ref 8.1–13.5)
POTASSIUM SERPL-SCNC: 4.5 MMOL/L (ref 3.7–5.3)
RBC # BLD AUTO: 3.05 M/UL (ref 3.95–5.11)
SODIUM SERPL-SCNC: 137 MMOL/L (ref 135–144)
TSH SERPL DL<=0.05 MIU/L-ACNC: 4.72 UIU/ML (ref 0.3–5)
WBC OTHER # BLD: 6.5 K/UL (ref 3.5–11.3)

## 2023-07-14 PROCEDURE — 80069 RENAL FUNCTION PANEL: CPT

## 2023-07-14 PROCEDURE — 85027 COMPLETE CBC AUTOMATED: CPT

## 2023-07-14 PROCEDURE — 84443 ASSAY THYROID STIM HORMONE: CPT

## 2023-07-14 PROCEDURE — 6360000002 HC RX W HCPCS: Performed by: INTERNAL MEDICINE

## 2023-07-14 PROCEDURE — 83735 ASSAY OF MAGNESIUM: CPT

## 2023-07-14 PROCEDURE — 6370000000 HC RX 637 (ALT 250 FOR IP): Performed by: INTERNAL MEDICINE

## 2023-07-14 PROCEDURE — 99232 SBSQ HOSP IP/OBS MODERATE 35: CPT | Performed by: INTERNAL MEDICINE

## 2023-07-14 PROCEDURE — 2580000003 HC RX 258: Performed by: INTERNAL MEDICINE

## 2023-07-14 PROCEDURE — 1200000000 HC SEMI PRIVATE

## 2023-07-14 PROCEDURE — 2700000000 HC OXYGEN THERAPY PER DAY

## 2023-07-14 PROCEDURE — 36415 COLL VENOUS BLD VENIPUNCTURE: CPT

## 2023-07-14 RX ORDER — MIDODRINE HYDROCHLORIDE 10 MG/1
10 TABLET ORAL 3 TIMES DAILY PRN
Status: DISCONTINUED | OUTPATIENT
Start: 2023-07-14 | End: 2023-07-17 | Stop reason: HOSPADM

## 2023-07-14 RX ORDER — ENOXAPARIN SODIUM 100 MG/ML
80 INJECTION SUBCUTANEOUS 2 TIMES DAILY
Status: DISCONTINUED | OUTPATIENT
Start: 2023-07-14 | End: 2023-07-16

## 2023-07-14 RX ADMIN — SODIUM CHLORIDE, PRESERVATIVE FREE 10 ML: 5 INJECTION INTRAVENOUS at 20:50

## 2023-07-14 RX ADMIN — SODIUM CHLORIDE, PRESERVATIVE FREE 10 ML: 5 INJECTION INTRAVENOUS at 08:45

## 2023-07-14 RX ADMIN — ENOXAPARIN SODIUM 80 MG: 100 INJECTION SUBCUTANEOUS at 20:50

## 2023-07-14 RX ADMIN — OXYBUTYNIN CHLORIDE 5 MG: 5 TABLET, EXTENDED RELEASE ORAL at 08:44

## 2023-07-14 NOTE — TELEPHONE ENCOUNTER
Received letter from [facility] stating pt received a temp supply of Esomepra mag    Will do PA if/when needed     Letter attached

## 2023-07-15 PROBLEM — K63.89 PNEUMATOSIS INTESTINALIS OF LARGE INTESTINE: Status: ACTIVE | Noted: 2023-07-15

## 2023-07-15 LAB
ALBUMIN SERPL-MCNC: 3.2 G/DL (ref 3.5–5.2)
ANION GAP SERPL CALCULATED.3IONS-SCNC: 8 MMOL/L (ref 9–17)
BUN SERPL-MCNC: 33 MG/DL (ref 8–23)
BUN/CREAT SERPL: 28 (ref 9–20)
CALCIUM SERPL-MCNC: 10 MG/DL (ref 8.6–10.4)
CHLORIDE SERPL-SCNC: 99 MMOL/L (ref 98–107)
CO2 SERPL-SCNC: 29 MMOL/L (ref 20–31)
CREAT SERPL-MCNC: 1.2 MG/DL (ref 0.5–0.9)
EKG ATRIAL RATE: 214 BPM
EKG Q-T INTERVAL: 496 MS
EKG QRS DURATION: 180 MS
EKG QTC CALCULATION (BAZETT): 496 MS
EKG R AXIS: -90 DEGREES
EKG T AXIS: 99 DEGREES
EKG VENTRICULAR RATE: 60 BPM
ERYTHROCYTE [DISTWIDTH] IN BLOOD BY AUTOMATED COUNT: 14.6 % (ref 11.8–14.4)
GFR SERPL CREATININE-BSD FRML MDRD: 42 ML/MIN/1.73M2
GLUCOSE SERPL-MCNC: 96 MG/DL (ref 70–99)
HCT VFR BLD AUTO: 33.8 % (ref 36.3–47.1)
HGB BLD-MCNC: 10.5 G/DL (ref 11.9–15.1)
MAGNESIUM SERPL-MCNC: 2.4 MG/DL (ref 1.6–2.6)
MCH RBC QN AUTO: 33.3 PG (ref 25.2–33.5)
MCHC RBC AUTO-ENTMCNC: 31.1 G/DL (ref 28.4–34.8)
MCV RBC AUTO: 107.3 FL (ref 82.6–102.9)
NRBC BLD-RTO: 0 PER 100 WBC
PHOSPHATE SERPL-MCNC: 3.2 MG/DL (ref 2.6–4.5)
PLATELET # BLD AUTO: 199 K/UL (ref 138–453)
PMV BLD AUTO: 9.1 FL (ref 8.1–13.5)
POTASSIUM SERPL-SCNC: 4 MMOL/L (ref 3.7–5.3)
RBC # BLD AUTO: 3.15 M/UL (ref 3.95–5.11)
SODIUM SERPL-SCNC: 136 MMOL/L (ref 135–144)
WBC OTHER # BLD: 5.6 K/UL (ref 3.5–11.3)

## 2023-07-15 PROCEDURE — 97530 THERAPEUTIC ACTIVITIES: CPT

## 2023-07-15 PROCEDURE — 2700000000 HC OXYGEN THERAPY PER DAY

## 2023-07-15 PROCEDURE — 6360000002 HC RX W HCPCS: Performed by: INTERNAL MEDICINE

## 2023-07-15 PROCEDURE — 83735 ASSAY OF MAGNESIUM: CPT

## 2023-07-15 PROCEDURE — 6360000002 HC RX W HCPCS: Performed by: NURSE PRACTITIONER

## 2023-07-15 PROCEDURE — 36415 COLL VENOUS BLD VENIPUNCTURE: CPT

## 2023-07-15 PROCEDURE — 2580000003 HC RX 258: Performed by: INTERNAL MEDICINE

## 2023-07-15 PROCEDURE — 97116 GAIT TRAINING THERAPY: CPT

## 2023-07-15 PROCEDURE — 80069 RENAL FUNCTION PANEL: CPT

## 2023-07-15 PROCEDURE — 93010 ELECTROCARDIOGRAM REPORT: CPT | Performed by: INTERNAL MEDICINE

## 2023-07-15 PROCEDURE — 97163 PT EVAL HIGH COMPLEX 45 MIN: CPT

## 2023-07-15 PROCEDURE — 1200000000 HC SEMI PRIVATE

## 2023-07-15 PROCEDURE — 6370000000 HC RX 637 (ALT 250 FOR IP): Performed by: INTERNAL MEDICINE

## 2023-07-15 PROCEDURE — 99232 SBSQ HOSP IP/OBS MODERATE 35: CPT | Performed by: INTERNAL MEDICINE

## 2023-07-15 PROCEDURE — 85027 COMPLETE CBC AUTOMATED: CPT

## 2023-07-15 RX ORDER — DIPHENHYDRAMINE HYDROCHLORIDE 50 MG/ML
25 INJECTION INTRAMUSCULAR; INTRAVENOUS ONCE
Status: COMPLETED | OUTPATIENT
Start: 2023-07-15 | End: 2023-07-15

## 2023-07-15 RX ADMIN — ENOXAPARIN SODIUM 80 MG: 100 INJECTION SUBCUTANEOUS at 20:41

## 2023-07-15 RX ADMIN — SODIUM CHLORIDE, PRESERVATIVE FREE 10 ML: 5 INJECTION INTRAVENOUS at 20:41

## 2023-07-15 RX ADMIN — SODIUM CHLORIDE, PRESERVATIVE FREE 10 ML: 5 INJECTION INTRAVENOUS at 08:47

## 2023-07-15 RX ADMIN — DIPHENHYDRAMINE HYDROCHLORIDE 25 MG: 50 INJECTION, SOLUTION INTRAMUSCULAR; INTRAVENOUS at 00:57

## 2023-07-15 RX ADMIN — ENOXAPARIN SODIUM 80 MG: 100 INJECTION SUBCUTANEOUS at 08:46

## 2023-07-15 RX ADMIN — OXYBUTYNIN CHLORIDE 5 MG: 5 TABLET, EXTENDED RELEASE ORAL at 08:46

## 2023-07-16 LAB
ALBUMIN SERPL-MCNC: 2.9 G/DL (ref 3.5–5.2)
ANION GAP SERPL CALCULATED.3IONS-SCNC: 8 MMOL/L (ref 9–17)
BUN SERPL-MCNC: 26 MG/DL (ref 8–23)
BUN/CREAT SERPL: 24 (ref 9–20)
CALCIUM SERPL-MCNC: 9.8 MG/DL (ref 8.6–10.4)
CHLORIDE SERPL-SCNC: 100 MMOL/L (ref 98–107)
CO2 SERPL-SCNC: 29 MMOL/L (ref 20–31)
CREAT SERPL-MCNC: 1.1 MG/DL (ref 0.5–0.9)
ERYTHROCYTE [DISTWIDTH] IN BLOOD BY AUTOMATED COUNT: 14.5 % (ref 11.8–14.4)
GFR SERPL CREATININE-BSD FRML MDRD: 47 ML/MIN/1.73M2
GLUCOSE SERPL-MCNC: 131 MG/DL (ref 70–99)
HCT VFR BLD AUTO: 32.1 % (ref 36.3–47.1)
HGB BLD-MCNC: 9.9 G/DL (ref 11.9–15.1)
MAGNESIUM SERPL-MCNC: 2.3 MG/DL (ref 1.6–2.6)
MCH RBC QN AUTO: 33.6 PG (ref 25.2–33.5)
MCHC RBC AUTO-ENTMCNC: 30.8 G/DL (ref 28.4–34.8)
MCV RBC AUTO: 108.8 FL (ref 82.6–102.9)
NRBC BLD-RTO: 0 PER 100 WBC
PHOSPHATE SERPL-MCNC: 2.8 MG/DL (ref 2.6–4.5)
PLATELET # BLD AUTO: 205 K/UL (ref 138–453)
PMV BLD AUTO: 9.1 FL (ref 8.1–13.5)
POTASSIUM SERPL-SCNC: 4.2 MMOL/L (ref 3.7–5.3)
RBC # BLD AUTO: 2.95 M/UL (ref 3.95–5.11)
SODIUM SERPL-SCNC: 137 MMOL/L (ref 135–144)
WBC OTHER # BLD: 5.8 K/UL (ref 3.5–11.3)

## 2023-07-16 PROCEDURE — 2580000003 HC RX 258: Performed by: INTERNAL MEDICINE

## 2023-07-16 PROCEDURE — 97535 SELF CARE MNGMENT TRAINING: CPT

## 2023-07-16 PROCEDURE — 36415 COLL VENOUS BLD VENIPUNCTURE: CPT

## 2023-07-16 PROCEDURE — 85027 COMPLETE CBC AUTOMATED: CPT

## 2023-07-16 PROCEDURE — 6360000002 HC RX W HCPCS: Performed by: INTERNAL MEDICINE

## 2023-07-16 PROCEDURE — 1200000000 HC SEMI PRIVATE

## 2023-07-16 PROCEDURE — 6370000000 HC RX 637 (ALT 250 FOR IP): Performed by: INTERNAL MEDICINE

## 2023-07-16 PROCEDURE — 97167 OT EVAL HIGH COMPLEX 60 MIN: CPT

## 2023-07-16 PROCEDURE — 2700000000 HC OXYGEN THERAPY PER DAY

## 2023-07-16 PROCEDURE — 99232 SBSQ HOSP IP/OBS MODERATE 35: CPT | Performed by: INTERNAL MEDICINE

## 2023-07-16 PROCEDURE — 80069 RENAL FUNCTION PANEL: CPT

## 2023-07-16 PROCEDURE — 83735 ASSAY OF MAGNESIUM: CPT

## 2023-07-16 RX ORDER — BUMETANIDE 1 MG/1
2 TABLET ORAL DAILY
Status: DISCONTINUED | OUTPATIENT
Start: 2023-07-17 | End: 2023-07-17 | Stop reason: HOSPADM

## 2023-07-16 RX ADMIN — APIXABAN 5 MG: 5 TABLET, FILM COATED ORAL at 20:13

## 2023-07-16 RX ADMIN — SODIUM CHLORIDE, PRESERVATIVE FREE 10 ML: 5 INJECTION INTRAVENOUS at 20:13

## 2023-07-16 RX ADMIN — PROPRANOLOL HYDROCHLORIDE 40 MG: 10 TABLET ORAL at 20:13

## 2023-07-16 RX ADMIN — PROPRANOLOL HYDROCHLORIDE 40 MG: 10 TABLET ORAL at 12:39

## 2023-07-16 RX ADMIN — SODIUM CHLORIDE, PRESERVATIVE FREE 10 ML: 5 INJECTION INTRAVENOUS at 08:55

## 2023-07-16 RX ADMIN — OXYBUTYNIN CHLORIDE 5 MG: 5 TABLET, EXTENDED RELEASE ORAL at 08:54

## 2023-07-16 RX ADMIN — ENOXAPARIN SODIUM 80 MG: 100 INJECTION SUBCUTANEOUS at 08:54

## 2023-07-17 VITALS
WEIGHT: 179.5 LBS | BODY MASS INDEX: 27.2 KG/M2 | HEIGHT: 68 IN | HEART RATE: 57 BPM | DIASTOLIC BLOOD PRESSURE: 53 MMHG | TEMPERATURE: 98.2 F | OXYGEN SATURATION: 95 % | RESPIRATION RATE: 16 BRPM | SYSTOLIC BLOOD PRESSURE: 120 MMHG

## 2023-07-17 LAB
ALBUMIN SERPL-MCNC: 3.1 G/DL (ref 3.5–5.2)
ANION GAP SERPL CALCULATED.3IONS-SCNC: 5 MMOL/L (ref 9–17)
BUN SERPL-MCNC: 20 MG/DL (ref 8–23)
BUN/CREAT SERPL: 22 (ref 9–20)
CALCIUM SERPL-MCNC: 9.9 MG/DL (ref 8.6–10.4)
CHLORIDE SERPL-SCNC: 97 MMOL/L (ref 98–107)
CO2 SERPL-SCNC: 30 MMOL/L (ref 20–31)
CREAT SERPL-MCNC: 0.9 MG/DL (ref 0.5–0.9)
ERYTHROCYTE [DISTWIDTH] IN BLOOD BY AUTOMATED COUNT: 14.3 % (ref 11.8–14.4)
GFR SERPL CREATININE-BSD FRML MDRD: 60 ML/MIN/1.73M2
GLUCOSE SERPL-MCNC: 114 MG/DL (ref 70–99)
HCT VFR BLD AUTO: 33 % (ref 36.3–47.1)
HGB BLD-MCNC: 10.4 G/DL (ref 11.9–15.1)
MAGNESIUM SERPL-MCNC: 2.2 MG/DL (ref 1.6–2.6)
MCH RBC QN AUTO: 33.8 PG (ref 25.2–33.5)
MCHC RBC AUTO-ENTMCNC: 31.5 G/DL (ref 28.4–34.8)
MCV RBC AUTO: 107.1 FL (ref 82.6–102.9)
NRBC BLD-RTO: 0 PER 100 WBC
PHOSPHATE SERPL-MCNC: 2.4 MG/DL (ref 2.6–4.5)
PLATELET # BLD AUTO: 199 K/UL (ref 138–453)
PMV BLD AUTO: 9 FL (ref 8.1–13.5)
POTASSIUM SERPL-SCNC: 4.3 MMOL/L (ref 3.7–5.3)
RBC # BLD AUTO: 3.08 M/UL (ref 3.95–5.11)
SODIUM SERPL-SCNC: 132 MMOL/L (ref 135–144)
WBC OTHER # BLD: 5.9 K/UL (ref 3.5–11.3)

## 2023-07-17 PROCEDURE — 36415 COLL VENOUS BLD VENIPUNCTURE: CPT

## 2023-07-17 PROCEDURE — 80069 RENAL FUNCTION PANEL: CPT

## 2023-07-17 PROCEDURE — 97116 GAIT TRAINING THERAPY: CPT

## 2023-07-17 PROCEDURE — 2580000003 HC RX 258: Performed by: INTERNAL MEDICINE

## 2023-07-17 PROCEDURE — 85027 COMPLETE CBC AUTOMATED: CPT

## 2023-07-17 PROCEDURE — 97530 THERAPEUTIC ACTIVITIES: CPT

## 2023-07-17 PROCEDURE — 97110 THERAPEUTIC EXERCISES: CPT

## 2023-07-17 PROCEDURE — 6370000000 HC RX 637 (ALT 250 FOR IP): Performed by: INTERNAL MEDICINE

## 2023-07-17 PROCEDURE — 83735 ASSAY OF MAGNESIUM: CPT

## 2023-07-17 PROCEDURE — 97535 SELF CARE MNGMENT TRAINING: CPT

## 2023-07-17 PROCEDURE — 99239 HOSP IP/OBS DSCHRG MGMT >30: CPT | Performed by: INTERNAL MEDICINE

## 2023-07-17 RX ORDER — MIDODRINE HYDROCHLORIDE 10 MG/1
10 TABLET ORAL 3 TIMES DAILY PRN
DISCHARGE
Start: 2023-07-17

## 2023-07-17 RX ADMIN — OXYBUTYNIN CHLORIDE 5 MG: 5 TABLET, EXTENDED RELEASE ORAL at 08:57

## 2023-07-17 RX ADMIN — PROPRANOLOL HYDROCHLORIDE 40 MG: 10 TABLET ORAL at 08:57

## 2023-07-17 RX ADMIN — APIXABAN 5 MG: 5 TABLET, FILM COATED ORAL at 08:57

## 2023-07-17 RX ADMIN — SODIUM CHLORIDE, PRESERVATIVE FREE 10 ML: 5 INJECTION INTRAVENOUS at 08:57

## 2023-07-17 RX ADMIN — BUMETANIDE 2 MG: 1 TABLET ORAL at 08:56

## 2023-07-17 NOTE — DISCHARGE SUMMARY
[facility]  Office: [address], DO, Carlo Means, DO, Laura Nunes, DO, Isamar Quintero Blood, DO, Chano Morales MD, Chuck Zamarripa MD, Elvia Lebron MD, Jasen Balderas MD,  Bo Hu MD, Rafa Acharya MD, Della Yusuf DO, Rolf Beverly MD,  Goldie Nolasco, DO, Alin Mon MD, Jerilyn Thompson MD, Osmar Russo DO, Tiffanie Woodruff MD,  Mara Roberts DO, Gagandeep Parham MD, Felicity Vargas MD, Andrew Boston MD, Sydney Mayfield MD,  Miah Strong MD, Vesna Fabian MD, Edwin Henderson, DO, Ze Martinez MD,  Rodolfo Torres MD, Rishi Chatman, Ana Livingston, CNP, Valeria Zimmerman, CNP, Shayy Price, CNP,  Aye Mcmahon, [facility], Delroy Olivier, [facility], Geneva Simeon, CNP, Arturo Tejada, CNP, Butch Pantoja, CNP, Christel Avina, CNP, Kemal Hernandez PA-C, Marie Cunha, CNS, Hussein Liang, CNP, Susana Chaidez, [facility]    Discharge Summary     Patient ID: Yair Monsivais  :  1931   MRN: [id number]     ACCOUNT:  [de-identified]   Patient's PCP: Mayra Woods MD  Admit Date: 2023   Discharge Date: 2023     Length of Stay: 4  Code Status:  DNR-CCA  Admitting Physician: Della Yusuf DO  Discharge Physician: Katheryn Farfan DO     Active Discharge Diagnoses:     Hospital Problem Lists:  Principal Problem:    Pneumatosis of intestines  Active Problems:    Hypertension    Atrial fibrillation ([address])    Acute abdominal pain  Resolved Problems:    * No resolved hospital problems. *      Admission Condition:  stable     Discharged Condition: stable    Hospital Stay:     Hospital Course:  Yair Monsivais is a  49-year-old female with a history of atrial fibrillation, GERD who presents to the emergency department for evaluation of abdominal pain and constipation.   CT of her abdomen pelvis showed extensive small bowel pneumatosis with intra and mesenteric air concerning for bowel

## 2023-07-17 NOTE — CARE COORDINATION
Social work; spoke to son and plan is settled for pt to return to Assisted Living at [address] [place] plan with one on one care and will need mare with out pt therapy order on mare. Phone for report: [phone]  Fax for mare: [phone]  Will advisee when time for ambulette known family preferred ambulette transfer and feels pt can do ok with that move. Will advise Rn of same. Need mare. Melinda rocha  Faxed mare to assisted living at [facility].  Melinda rocha

## 2023-07-17 NOTE — FLOWSHEET NOTE
Pt repositioned in bed 2 assist, sitting upright and given meal tray and dentures, bed locked and in low position call light and bedside table within reach, bed alarm on.

## 2023-07-17 NOTE — CARE COORDINATION
Discharge Planning    Pneumotosis of small bowel,   Pt had small bowel follow through resulting in BM,   Eating regular food, Gen Surg signed off. Return ot [address] [facility] living. Will get PT OT services outpatient thru [place]. ROSALINDA will set up stretcher transport for later today.

## 2023-07-17 NOTE — DISCHARGE INSTRUCTIONS
-Make an appoint with your primary care physician within 1 week of discharge. Follow-up with surgery as needed.   -Diet as tolerated, physical therapy

## 2023-07-17 NOTE — PLAN OF CARE
Problem: Discharge Planning  Goal: Discharge to home or other facility with appropriate resources  Outcome: Progressing  Flowsheets (Taken 7/16/2023 2000)  Discharge to home or other facility with appropriate resources:   Identify barriers to discharge with patient and caregiver   Arrange for needed discharge resources and transportation as appropriate   Identify discharge learning needs (meds, wound care, etc)     Problem: Skin/Tissue Integrity  Goal: Absence of new skin breakdown  Description: 1. Monitor for areas of redness and/or skin breakdown  2. Assess vascular access sites hourly  3. Every 4-6 hours minimum:  Change oxygen saturation probe site  4. Every 4-6 hours:  If on nasal continuous positive airway pressure, respiratory therapy assess nares and determine need for appliance change or resting period.   Outcome: Progressing     Problem: Safety - Adult  Goal: Free from fall injury  Outcome: Progressing     Problem: ABCDS Injury Assessment  Goal: Absence of physical injury  Outcome: Progressing     Problem: Pain  Goal: Verbalizes/displays adequate comfort level or baseline comfort level  Outcome: Progressing     Problem: Gastrointestinal - Adult  Goal: Minimal or absence of nausea and vomiting  Outcome: Progressing  Goal: Maintains or returns to baseline bowel function  Outcome: Progressing  Goal: Maintains adequate nutritional intake  Outcome: Progressing     Problem: Nutrition Deficit:  Goal: Optimize nutritional status  Outcome: Progressing

## 2023-08-16 DIAGNOSIS — G89.29 CHRONIC MIDLINE LOW BACK PAIN WITHOUT SCIATICA: ICD-10-CM

## 2023-08-16 DIAGNOSIS — M54.50 CHRONIC MIDLINE LOW BACK PAIN WITHOUT SCIATICA: ICD-10-CM

## 2023-08-28 ENCOUNTER — TELEPHONE (OUTPATIENT)
Dept: FAMILY MEDICINE CLINIC | Age: 88
End: 2023-08-28

## 2023-08-28 DIAGNOSIS — R32 URINARY INCONTINENCE, UNSPECIFIED TYPE: Primary | ICD-10-CM

## 2023-08-28 NOTE — TELEPHONE ENCOUNTER
Josephine Gaona with [facility] called asking for an order to check for UTI. States patient has been having increased incontinence, which she is not aware of.     They will need order faxed to [phone]

## 2023-09-01 ENCOUNTER — TELEPHONE (OUTPATIENT)
Dept: FAMILY MEDICINE CLINIC | Age: 88
End: 2023-09-01

## 2023-09-01 NOTE — TELEPHONE ENCOUNTER
Meredith Veras from [facility] calls to check if we got the results from the urine culture. She informed it did come back positive for UTI and would like to get her started on a antibiotic before the weekend if were able too.      She requests a call back regarding medication     P:  F: [phone]

## 2023-09-05 RX ORDER — NITROFURANTOIN 25; 75 MG/1; MG/1
100 CAPSULE ORAL 2 TIMES DAILY
Qty: 14 CAPSULE | Refills: 0 | Status: SHIPPED | OUTPATIENT
Start: 2023-09-05 | End: 2023-09-12

## 2023-10-16 ENCOUNTER — OFFICE VISIT (OUTPATIENT)
Dept: FAMILY MEDICINE CLINIC | Age: 88
End: 2023-10-16
Payer: MEDICARE

## 2023-10-16 VITALS
SYSTOLIC BLOOD PRESSURE: 138 MMHG | HEART RATE: 62 BPM | TEMPERATURE: 97.3 F | DIASTOLIC BLOOD PRESSURE: 62 MMHG | OXYGEN SATURATION: 97 %

## 2023-10-16 DIAGNOSIS — I48.0 PAROXYSMAL ATRIAL FIBRILLATION (HCC): ICD-10-CM

## 2023-10-16 DIAGNOSIS — I10 PRIMARY HYPERTENSION: ICD-10-CM

## 2023-10-16 DIAGNOSIS — I89.0 ACQUIRED LYMPHEDEMA: ICD-10-CM

## 2023-10-16 DIAGNOSIS — Z95.0 PRESENCE OF CARDIAC PACEMAKER: ICD-10-CM

## 2023-10-16 DIAGNOSIS — I47.10 PAROXYSMAL SUPRAVENTRICULAR TACHYCARDIA: ICD-10-CM

## 2023-10-16 DIAGNOSIS — N39.46 MIXED STRESS AND URGE URINARY INCONTINENCE: Primary | ICD-10-CM

## 2023-10-16 PROCEDURE — 0509F URINE INCON PLAN DOCD: CPT | Performed by: INTERNAL MEDICINE

## 2023-10-16 PROCEDURE — G8417 CALC BMI ABV UP PARAM F/U: HCPCS | Performed by: INTERNAL MEDICINE

## 2023-10-16 PROCEDURE — 99214 OFFICE O/P EST MOD 30 MIN: CPT | Performed by: INTERNAL MEDICINE

## 2023-10-16 PROCEDURE — 1090F PRES/ABSN URINE INCON ASSESS: CPT | Performed by: INTERNAL MEDICINE

## 2023-10-16 PROCEDURE — G8427 DOCREV CUR MEDS BY ELIG CLIN: HCPCS | Performed by: INTERNAL MEDICINE

## 2023-10-16 PROCEDURE — 1123F ACP DISCUSS/DSCN MKR DOCD: CPT | Performed by: INTERNAL MEDICINE

## 2023-10-16 PROCEDURE — G8484 FLU IMMUNIZE NO ADMIN: HCPCS | Performed by: INTERNAL MEDICINE

## 2023-10-16 PROCEDURE — 1036F TOBACCO NON-USER: CPT | Performed by: INTERNAL MEDICINE

## 2023-10-16 ASSESSMENT — ENCOUNTER SYMPTOMS
ABDOMINAL PAIN: 0
BLOOD IN STOOL: 0
ANAL BLEEDING: 0
SHORTNESS OF BREATH: 0
COUGH: 0
CHOKING: 0
VOMITING: 0
WHEEZING: 0
NAUSEA: 0
CHEST TIGHTNESS: 0
CONSTIPATION: 0
DIARRHEA: 0

## 2023-10-16 ASSESSMENT — VISUAL ACUITY: OU: 1

## 2023-10-16 NOTE — PROGRESS NOTES
[facility]  [facility] Caroline Carlos [address]  [address] [facility] [address]  Dept: [phone]  Dept Fax: [phone]      Primo Sanon is a 80 y.o. female who presents today for hermedical conditions/complaints as noted below. Primo Sanon is c/o of Hypertension (F/u) and Gastroesophageal Reflux (F/u)        Assessment/Plan:     1. Mixed stress and urge urinary incontinence  -     mirabegron (MYRBETRIQ) 25 MG TB24; Take 1 tablet by mouth daily, Disp-30 tablet, R-3Normal  2. Paroxysmal atrial fibrillation (HCC)  3. Acquired lymphedema  4. Primary hypertension  5. Paroxysmal supraventricular tachycardia  6. Presence of cardiac pacemaker    Start myrbetriq  [facility] signed for PRN leg wraps/compression stockings       No follow-ups on file. HPI     Has upcoming appointment with derm to remove forehead lesion - has had to be r/s twice so far   Has cardiology appointment coming up since her pacemaker battery is coming to the end of its life   Having a lot of issues with urinary incontinence despite the oxybutynin  GERD - well controlled with omeprazole. Denies heartburn with food, nocturnal reflux, dysphagia, weight changes, anorexia, melena, hematochezia, diarrhea, nausea, vomiting. Hypertension-tolerating current regimen without chest pain, palpitations, dizziness, dyspnea on exertion, orthopnea, paroxysmal nocturnal dyspnea. Hyperlipidemia-tolerating current regimen without myalgias, dyspepsia, jaundice. Mostly compliant with diet recommendations for low salt diet, tries to limit greasy/cheesy/fried foods, not very compliant with exercise recommendations.     Cardiovascular risk factors: advanced age (older than 54 for men, 72 for women), dyslipidemia, hypertension, and sedentary lifestyle            BP Readings from Last 3 Encounters:   10/16/23 138/62   07/17/23 (!) 120/53   04/13/23 120/60              Past Medical History:   Diagnosis Date    Atrial

## 2023-10-16 NOTE — PROGRESS NOTES
Pt is here today for a regular 6 mo f/u    Pt would like to discuss the water pill, and her incontinence     Pt may need an order to do legs wraps PRN, not getting any help from vascular

## 2023-10-17 ENCOUNTER — TELEPHONE (OUTPATIENT)
Dept: FAMILY MEDICINE CLINIC | Age: 88
End: 2023-10-17

## 2023-10-17 NOTE — TELEPHONE ENCOUNTER
[facility] ([phone]) called in regards to patient taking both Mybetriq and oxybutynin     Ref # [id number]    PLEASE ADVISE

## 2023-10-18 NOTE — TELEPHONE ENCOUNTER
I notified Catalino Jackson for [address] [facility] the Oxybutynin 15 mg is to be DC'd

## 2024-03-26 NOTE — TELEPHONE ENCOUNTER
attack     Bruit     Chest pain     Edema     Kidney cysts     Sciatica     Pressure injury of skin of sacral region     Chronic midline low back pain without sciatica     Mixed stress and urge urinary incontinence     Acute abdominal pain     Pneumatosis of intestines

## 2024-03-27 RX ORDER — ACETAMINOPHEN 325 MG/1
650 TABLET ORAL EVERY 6 HOURS PRN
Qty: 120 TABLET | Refills: 3 | Status: SHIPPED | OUTPATIENT
Start: 2024-03-27

## 2024-05-08 DIAGNOSIS — R06.2 WHEEZING: ICD-10-CM

## 2024-05-08 DIAGNOSIS — R06.02 SHORTNESS OF BREATH: Primary | ICD-10-CM

## 2024-05-08 RX ORDER — ALBUTEROL SULFATE 2.5 MG/3ML
2.5 SOLUTION RESPIRATORY (INHALATION) EVERY 4 HOURS PRN
Qty: 120 EACH | Refills: 5 | Status: SHIPPED | OUTPATIENT
Start: 2024-05-08

## 2024-05-08 NOTE — TELEPHONE ENCOUNTER
Last visit: 10/16/2023  Last Med refill:   Does patient have enough medication for 72 hours: No:     Fax attached     Next Visit Date:  No future appointments.    Health Maintenance   Topic Date Due    Shingles vaccine (1 of 2) Never done    Respiratory Syncytial Virus (RSV) Pregnant or age 60 yrs+ (1 - 1-dose 60+ series) Never done    COVID-19 Vaccine (6 - 2023-24 season) 09/01/2023    Annual Wellness Visit (Medicare)  11/27/2023    Depression Screen  04/13/2024    Flu vaccine (Season Ended) 08/01/2024    DTaP/Tdap/Td vaccine (3 - Td or Tdap) 09/26/2030    Pneumococcal 65+ years Vaccine  Completed    Hepatitis A vaccine  Aged Out    Hepatitis B vaccine  Aged Out    Hib vaccine  Aged Out    Polio vaccine  Aged Out    Meningococcal (ACWY) vaccine  Aged Out       No results found for: \"LABA1C\"          ( goal A1C is < 7)   No components found for: \"LABMICR\"  No components found for: \"LDLCHOLESTEROL\", \"LDLCALC\"    (goal LDL is <100)   AST (U/L)   Date Value   07/13/2023 25     ALT (U/L)   Date Value   07/13/2023 14     BUN (mg/dL)   Date Value   07/17/2023 20     BP Readings from Last 3 Encounters:   10/16/23 138/62   07/17/23 (!) 120/53   04/13/23 120/60          (goal 120/80)    All Future Testing planned in [facility]  Lab Frequency Next Occurrence   Culture, Urine Once 08/28/2023               Patient Active Problem List:     Acid reflux     Hypertension     Osteoarthritis     Atrial fibrillation (HCC)     Sick sinus syndrome (HCC)     Squamous cell carcinoma of skin of face     Broken nose     Morbid obesity (HCC)     Lymphedema of leg     Presence of cardiac pacemaker     Paroxysmal supraventricular tachycardia (HCC)     Mitral valve disease     Breast cancer in female (HCC)     Acquired lymphedema     Hyperlipidemia     Anterior circulation transient ischemic attack     Bruit     Chest pain     Edema     Kidney cysts     Sciatica     Pressure injury of skin of sacral region     Chronic midline low back pain

## 2024-05-15 ENCOUNTER — TELEPHONE (OUTPATIENT)
Dept: FAMILY MEDICINE CLINIC | Age: 89
End: 2024-05-15

## 2024-05-15 NOTE — TELEPHONE ENCOUNTER
Received letter from [facility] that pt received temp supply pf medication,     Will do PA if/when needed     Letter attached

## 2024-06-06 ENCOUNTER — TELEPHONE (OUTPATIENT)
Dept: FAMILY MEDICINE CLINIC | Age: 89
End: 2024-06-06

## 2024-06-06 NOTE — TELEPHONE ENCOUNTER
Pt son called and states that pt is in [place] and will be following with the NP within the facility. Pt son said this was a hard decision and this is not about being dissatisfied with Dr. Farris or staff. Pt son wanted to thank you for everything you have done for pt and her family.     The [facility] NP will be taking over medication, orders, and anything else that is needed.

## 2024-06-06 NOTE — TELEPHONE ENCOUNTER
It is appropriate for her to be seen by someone who can see her where she is now. I appreciate them letting us know.

## 2025-03-17 ENCOUNTER — HOSPITAL ENCOUNTER (OUTPATIENT)
Age: 89
Setting detail: SPECIMEN
Discharge: HOME | End: 2025-03-17
Payer: MEDICARE

## 2025-03-17 LAB
ANION GAP SERPL CALCULATED.3IONS-SCNC: 12 MMOL/L (ref 9–16)
BUN SERPL-MCNC: 35 MG/DL (ref 8–23)
CALCIUM SERPL-MCNC: 10.2 MG/DL (ref 8.2–9.6)
CHLORIDE SERPL-SCNC: 99 MMOL/L (ref 98–107)
CO2 SERPL-SCNC: 28 MMOL/L (ref 20–31)
CREAT SERPL-MCNC: 1.4 MG/DL (ref 0.5–0.9)
GFR, ESTIMATED: 36 ML/MIN/1.73M2
GLUCOSE SERPL-MCNC: 132 MG/DL (ref 75–121)
POTASSIUM SERPL-SCNC: 3.9 MMOL/L (ref 3.7–5.3)
SODIUM SERPL-SCNC: 139 MMOL/L (ref 136–145)

## 2025-03-17 PROCEDURE — 36415 COLL VENOUS BLD VENIPUNCTURE: CPT

## 2025-03-17 PROCEDURE — 80048 BASIC METABOLIC PNL TOTAL CA: CPT

## 2025-04-03 ENCOUNTER — HOSPITAL ENCOUNTER (OUTPATIENT)
Age: 89
Setting detail: SPECIMEN
Discharge: HOME | End: 2025-04-03
Payer: MEDICARE

## 2025-04-03 LAB
ANION GAP SERPL CALCULATED.3IONS-SCNC: 12 MMOL/L (ref 9–16)
BUN SERPL-MCNC: 41 MG/DL (ref 8–23)
CALCIUM SERPL-MCNC: 10.3 MG/DL (ref 8.2–9.6)
CHLORIDE SERPL-SCNC: 100 MMOL/L (ref 98–107)
CO2 SERPL-SCNC: 27 MMOL/L (ref 20–31)
CREAT SERPL-MCNC: 1.4 MG/DL (ref 0.5–0.9)
GFR, ESTIMATED: 35 ML/MIN/1.73M2
GLUCOSE SERPL-MCNC: 105 MG/DL (ref 75–121)
POTASSIUM SERPL-SCNC: 4.1 MMOL/L (ref 3.7–5.3)
SODIUM SERPL-SCNC: 139 MMOL/L (ref 136–145)

## 2025-04-03 PROCEDURE — 80048 BASIC METABOLIC PNL TOTAL CA: CPT

## 2025-04-03 PROCEDURE — 36415 COLL VENOUS BLD VENIPUNCTURE: CPT

## 2025-04-07 ENCOUNTER — HOSPITAL ENCOUNTER (OUTPATIENT)
Age: 89
Setting detail: SPECIMEN
Discharge: HOME | End: 2025-04-07
Payer: MEDICARE

## 2025-04-07 LAB
CA-I BLD-SCNC: 1.26 MMOL/L (ref 1.13–1.33)
PTH-INTACT SERPL-MCNC: 153 PG/ML (ref 17.9–58.6)

## 2025-04-07 PROCEDURE — 83970 ASSAY OF PARATHORMONE: CPT

## 2025-04-07 PROCEDURE — 82330 ASSAY OF CALCIUM: CPT

## 2025-04-07 PROCEDURE — 36415 COLL VENOUS BLD VENIPUNCTURE: CPT

## 2025-05-12 ENCOUNTER — HOSPITAL ENCOUNTER (OUTPATIENT)
Age: 89
Setting detail: SPECIMEN
Discharge: HOME | End: 2025-05-12
Payer: MEDICARE

## 2025-05-12 LAB — CA-I BLD-SCNC: 1.26 MMOL/L (ref 1.13–1.33)

## 2025-05-12 PROCEDURE — 82330 ASSAY OF CALCIUM: CPT

## 2025-05-12 PROCEDURE — 36415 COLL VENOUS BLD VENIPUNCTURE: CPT

## 2025-08-07 ENCOUNTER — HOSPITAL ENCOUNTER (OUTPATIENT)
Age: 89
Setting detail: SPECIMEN
Discharge: HOME | End: 2025-08-07
Payer: MEDICARE

## 2025-08-07 LAB
ALBUMIN SERPL-MCNC: 3.6 G/DL (ref 3.5–5.2)
ALBUMIN/GLOB SERPL: 1 {RATIO} (ref 1–2.5)
ALP SERPL-CCNC: 154 U/L (ref 35–104)
ALT SERPL-CCNC: 13 U/L (ref 10–35)
ANION GAP SERPL CALCULATED.3IONS-SCNC: 13 MMOL/L (ref 9–16)
AST SERPL-CCNC: 26 U/L (ref 10–35)
BILIRUB SERPL-MCNC: 0.9 MG/DL (ref 0–1.2)
BNP SERPL-MCNC: 5605 PG/ML (ref 0–450)
BUN SERPL-MCNC: 37 MG/DL (ref 8–23)
CALCIUM SERPL-MCNC: 10.6 MG/DL (ref 8.2–9.6)
CHLORIDE SERPL-SCNC: 98 MMOL/L (ref 98–107)
CO2 SERPL-SCNC: 27 MMOL/L (ref 20–31)
CREAT SERPL-MCNC: 1.4 MG/DL (ref 0.5–0.9)
ERYTHROCYTE [DISTWIDTH] IN BLOOD BY AUTOMATED COUNT: 13.3 % (ref 11.8–14.4)
GFR, ESTIMATED: 36 ML/MIN/1.73M2
GLUCOSE SERPL-MCNC: 112 MG/DL (ref 75–121)
HCT VFR BLD AUTO: 37.4 % (ref 36.3–47.1)
HGB BLD-MCNC: 12.4 G/DL (ref 11.9–15.1)
MCH RBC QN AUTO: 34.9 PG (ref 25.2–33.5)
MCHC RBC AUTO-ENTMCNC: 33.2 G/DL (ref 28.4–34.8)
MCV RBC AUTO: 105.4 FL (ref 82.6–102.9)
NRBC BLD-RTO: 0 PER 100 WBC
PLATELET # BLD AUTO: 232 K/UL (ref 138–453)
PMV BLD AUTO: 10 FL (ref 8.1–13.5)
POTASSIUM SERPL-SCNC: 3.8 MMOL/L (ref 3.7–5.3)
PROT SERPL-MCNC: 7.4 G/DL (ref 6.6–8.7)
RBC # BLD AUTO: 3.55 M/UL (ref 3.95–5.11)
SODIUM SERPL-SCNC: 138 MMOL/L (ref 136–145)
WBC OTHER # BLD: 6.5 K/UL (ref 3.5–11.3)

## 2025-08-07 PROCEDURE — 36415 COLL VENOUS BLD VENIPUNCTURE: CPT

## 2025-08-07 PROCEDURE — 80053 COMPREHEN METABOLIC PANEL: CPT

## 2025-08-07 PROCEDURE — 85027 COMPLETE CBC AUTOMATED: CPT

## 2025-08-07 PROCEDURE — 83880 ASSAY OF NATRIURETIC PEPTIDE: CPT

## 2025-08-14 ENCOUNTER — HOSPITAL ENCOUNTER (OUTPATIENT)
Age: 89
Setting detail: SPECIMEN
Discharge: HOME | End: 2025-08-14

## 2025-08-14 LAB
ANION GAP SERPL CALCULATED.3IONS-SCNC: 13 MMOL/L (ref 9–16)
BNP SERPL-MCNC: 4980 PG/ML (ref 0–450)
BUN SERPL-MCNC: 39 MG/DL (ref 8–23)
CALCIUM SERPL-MCNC: 10.5 MG/DL (ref 8.2–9.6)
CHLORIDE SERPL-SCNC: 95 MMOL/L (ref 98–107)
CO2 SERPL-SCNC: 28 MMOL/L (ref 20–31)
CREAT SERPL-MCNC: 1.5 MG/DL (ref 0.5–0.9)
GFR, ESTIMATED: 32 ML/MIN/1.73M2
GLUCOSE SERPL-MCNC: 107 MG/DL (ref 75–121)
POTASSIUM SERPL-SCNC: 4.5 MMOL/L (ref 3.7–5.3)
SODIUM SERPL-SCNC: 135 MMOL/L (ref 136–145)

## 2025-08-14 PROCEDURE — 36415 COLL VENOUS BLD VENIPUNCTURE: CPT

## 2025-08-14 PROCEDURE — 80048 BASIC METABOLIC PNL TOTAL CA: CPT

## 2025-08-14 PROCEDURE — 83880 ASSAY OF NATRIURETIC PEPTIDE: CPT
